# Patient Record
Sex: MALE | Race: WHITE | NOT HISPANIC OR LATINO | Employment: FULL TIME
[De-identification: names, ages, dates, MRNs, and addresses within clinical notes are randomized per-mention and may not be internally consistent; named-entity substitution may affect disease eponyms.]

---

## 2017-07-21 ENCOUNTER — RECORDS - HEALTHEAST (OUTPATIENT)
Dept: ADMINISTRATIVE | Facility: OTHER | Age: 37
End: 2017-07-21

## 2017-11-15 ENCOUNTER — OFFICE VISIT - HEALTHEAST (OUTPATIENT)
Dept: FAMILY MEDICINE | Facility: CLINIC | Age: 37
End: 2017-11-15

## 2017-11-15 DIAGNOSIS — M77.01 BILATERAL MEDIAL EPICONDYLITIS OF ELBOW JOINT: ICD-10-CM

## 2017-11-15 DIAGNOSIS — M77.02 BILATERAL MEDIAL EPICONDYLITIS OF ELBOW JOINT: ICD-10-CM

## 2017-11-15 DIAGNOSIS — Z00.00 ROUTINE PHYSICAL EXAMINATION: ICD-10-CM

## 2017-11-15 LAB
CHOLEST SERPL-MCNC: 194 MG/DL
FASTING STATUS PATIENT QL REPORTED: YES
HDLC SERPL-MCNC: 76 MG/DL
LDLC SERPL CALC-MCNC: 107 MG/DL
TRIGL SERPL-MCNC: 56 MG/DL

## 2017-11-15 ASSESSMENT — MIFFLIN-ST. JEOR: SCORE: 2104.22

## 2018-08-28 ENCOUNTER — COMMUNICATION - HEALTHEAST (OUTPATIENT)
Dept: SCHEDULING | Facility: CLINIC | Age: 38
End: 2018-08-28

## 2018-08-29 ENCOUNTER — OFFICE VISIT - HEALTHEAST (OUTPATIENT)
Dept: FAMILY MEDICINE | Facility: CLINIC | Age: 38
End: 2018-08-29

## 2018-08-29 DIAGNOSIS — K40.90 LEFT INGUINAL HERNIA: ICD-10-CM

## 2018-08-29 RX ORDER — IBUPROFEN 600 MG/1
600 TABLET, FILM COATED ORAL
Status: SHIPPED | COMMUNITY
Start: 2015-01-17

## 2018-08-29 RX ORDER — CETIRIZINE HYDROCHLORIDE, PSEUDOEPHEDRINE HYDROCHLORIDE 5; 120 MG/1; MG/1
1 TABLET, FILM COATED, EXTENDED RELEASE ORAL 2 TIMES DAILY
Status: SHIPPED | COMMUNITY
Start: 2018-08-29

## 2018-08-31 ENCOUNTER — OFFICE VISIT - HEALTHEAST (OUTPATIENT)
Dept: SURGERY | Facility: CLINIC | Age: 38
End: 2018-08-31

## 2018-08-31 DIAGNOSIS — K40.90 LEFT INGUINAL HERNIA: ICD-10-CM

## 2018-08-31 ASSESSMENT — MIFFLIN-ST. JEOR: SCORE: 2093.33

## 2018-09-18 ENCOUNTER — COMMUNICATION - HEALTHEAST (OUTPATIENT)
Dept: FAMILY MEDICINE | Facility: CLINIC | Age: 38
End: 2018-09-18

## 2018-09-19 ENCOUNTER — ANESTHESIA - HEALTHEAST (OUTPATIENT)
Dept: SURGERY | Facility: AMBULATORY SURGERY CENTER | Age: 38
End: 2018-09-19

## 2018-09-20 ENCOUNTER — OFFICE VISIT - HEALTHEAST (OUTPATIENT)
Dept: FAMILY MEDICINE | Facility: CLINIC | Age: 38
End: 2018-09-20

## 2018-09-20 DIAGNOSIS — Z01.818 PRE-OP EXAM: ICD-10-CM

## 2018-09-20 DIAGNOSIS — K40.90 HERNIA, INGUINAL: ICD-10-CM

## 2018-09-21 ENCOUNTER — SURGERY - HEALTHEAST (OUTPATIENT)
Dept: SURGERY | Facility: AMBULATORY SURGERY CENTER | Age: 38
End: 2018-09-21

## 2018-09-21 ASSESSMENT — MIFFLIN-ST. JEOR: SCORE: 2097.87

## 2018-10-08 ENCOUNTER — OFFICE VISIT - HEALTHEAST (OUTPATIENT)
Dept: SURGERY | Facility: CLINIC | Age: 38
End: 2018-10-08

## 2018-10-08 DIAGNOSIS — K40.91 RECURRENT LEFT INGUINAL HERNIA: ICD-10-CM

## 2018-10-08 ASSESSMENT — MIFFLIN-ST. JEOR: SCORE: 2097.87

## 2018-11-19 ENCOUNTER — RECORDS - HEALTHEAST (OUTPATIENT)
Dept: ADMINISTRATIVE | Facility: OTHER | Age: 38
End: 2018-11-19

## 2018-11-21 ENCOUNTER — RECORDS - HEALTHEAST (OUTPATIENT)
Dept: ADMINISTRATIVE | Facility: OTHER | Age: 38
End: 2018-11-21

## 2018-11-29 ENCOUNTER — OFFICE VISIT - HEALTHEAST (OUTPATIENT)
Dept: FAMILY MEDICINE | Facility: CLINIC | Age: 38
End: 2018-11-29

## 2018-11-29 DIAGNOSIS — M71.161 SEPTIC PREPATELLAR BURSITIS OF RIGHT KNEE: ICD-10-CM

## 2018-12-03 ENCOUNTER — COMMUNICATION - HEALTHEAST (OUTPATIENT)
Dept: FAMILY MEDICINE | Facility: CLINIC | Age: 38
End: 2018-12-03

## 2018-12-10 ENCOUNTER — OFFICE VISIT - HEALTHEAST (OUTPATIENT)
Dept: FAMILY MEDICINE | Facility: CLINIC | Age: 38
End: 2018-12-10

## 2018-12-10 DIAGNOSIS — M71.161 SEPTIC PREPATELLAR BURSITIS OF RIGHT KNEE: ICD-10-CM

## 2019-01-31 ENCOUNTER — COMMUNICATION - HEALTHEAST (OUTPATIENT)
Dept: ALLERGY | Facility: CLINIC | Age: 39
End: 2019-01-31

## 2020-10-09 ENCOUNTER — OFFICE VISIT - HEALTHEAST (OUTPATIENT)
Dept: FAMILY MEDICINE | Facility: CLINIC | Age: 40
End: 2020-10-09

## 2020-10-09 DIAGNOSIS — S61.012A LACERATION OF LEFT THUMB WITHOUT FOREIGN BODY WITHOUT DAMAGE TO NAIL, INITIAL ENCOUNTER: ICD-10-CM

## 2021-05-26 VITALS
OXYGEN SATURATION: 97 % | DIASTOLIC BLOOD PRESSURE: 79 MMHG | RESPIRATION RATE: 16 BRPM | HEART RATE: 62 BPM | SYSTOLIC BLOOD PRESSURE: 144 MMHG

## 2021-05-31 VITALS — HEIGHT: 77 IN | WEIGHT: 237.4 LBS | BODY MASS INDEX: 28.03 KG/M2

## 2021-06-01 VITALS — BODY MASS INDEX: 27.88 KG/M2 | WEIGHT: 235.13 LBS

## 2021-06-01 VITALS — WEIGHT: 235 LBS | HEIGHT: 77 IN | BODY MASS INDEX: 27.75 KG/M2

## 2021-06-02 VITALS — WEIGHT: 236 LBS | HEIGHT: 77 IN | BODY MASS INDEX: 27.87 KG/M2

## 2021-06-02 VITALS — WEIGHT: 243.1 LBS | BODY MASS INDEX: 28.83 KG/M2

## 2021-06-02 VITALS — WEIGHT: 236.4 LBS | BODY MASS INDEX: 28.03 KG/M2

## 2021-06-02 VITALS — BODY MASS INDEX: 27.87 KG/M2 | WEIGHT: 236 LBS | HEIGHT: 77 IN

## 2021-06-02 VITALS — BODY MASS INDEX: 28.46 KG/M2 | WEIGHT: 240 LBS

## 2021-06-12 NOTE — PROGRESS NOTES
Impression:  Skin avulsion left thumb    Plan:  The base of the wound was packed with Surgicel resulting in good control of the bleeding.  Dressings were applied.  The patient's tetanus status was updated with a Tdap booster.  Keep the dressings on for 48 hours and then gently remove them and keep the wound clean and dry, return if redness swelling drainage or other problems      Chief Complaint:  Chief Complaint   Patient presents with     Laceration     LT thumb about an hour ago-- cut chunk of skin off          HPI:   Suhas Barbosa is a 40 y.o. male who presents to this clinic for the evaluation of left thumb injury.  The patient was slicing some vegetables when he accidentally cut off a small bit of skin on the tip of his left thumb.  He continued bleeding and he could not control the bleeding with pressure.  No numbness or weakness.  No other injuries.  Last tetanus was greater than 10 years ago      PMH:   Past Medical History:   Diagnosis Date     Hiatal hernia      Septic prepatellar bursitis of right knee 11/21/2018     Sore Throat     Created by Conversion      Past Surgical History:   Procedure Laterality Date     NASAL FRACTURE SURGERY       LA LAP,INGUINAL HERNIA REPR,INITIAL Left 9/21/2018    Procedure: REPAIR, HERNIA, INGUINAL, LAPAROSCOPIC;  Surgeon: Venkatesh Fernandez MD;  Location: [facility];  Service: General         ROS:  All other systems negative    Meds:    Current Outpatient Medications:      cetirizine-pseudoephedrine (ZYRTEC-D) 5-120 mg per tablet, Take 1 tablet by mouth 2 (two) times a day., Disp: , Rfl:      ibuprofen (ADVIL,MOTRIN) 600 MG tablet, Take 600 mg by mouth., Disp: , Rfl:      multivitamin Liqd solution, Take 5 mL by mouth daily., Disp: , Rfl:         Social:  Social History     Socioeconomic History     Marital status:      Spouse name: Not on file     Number of children: Not on file     Years of education: Not on file     Highest education level: Not on file    Occupational History     Not on file   Social Needs     Financial resource strain: Not on file     Food insecurity     Worry: Not on file     Inability: Not on file     Transportation needs     Medical: Not on file     Non-medical: Not on file   Tobacco Use     Smoking status: Former Smoker     Smokeless tobacco: Never Used   Substance and Sexual Activity     Alcohol use: No     Drug use: No     Sexual activity: Yes     Partners: Female     Comment: 4 children    Lifestyle     Physical activity     Days per week: Not on file     Minutes per session: Not on file     Stress: Not on file   Relationships     Social connections     Talks on phone: Not on file     Gets together: Not on file     Attends Anabaptism service: Not on file     Active member of club or organization: Not on file     Attends meetings of clubs or organizations: Not on file     Relationship status: Not on file     Intimate partner violence     Fear of current or ex partner: Not on file     Emotionally abused: Not on file     Physically abused: Not on file     Forced sexual activity: Not on file   Other Topics Concern     Not on file   Social History Narrative     Not on file         Physical Exam:  Vitals:    10/09/20 1905   BP: 144/79   Pulse: 62   Resp: 16   SpO2: 97%      Left thumb shows an area of skin avulsion off the tip of the left thumb about 7 x 6 mm in diameter.  There is venous oozing from the base of the wound.  No foreign body seen.  There is normal sensation on the tip of the thumb.  Capillary refill is 1 second.  There is normal range of motion throughout all joints in the left thumb.  No other injury    Results:    No results found for this or any previous visit (from the past 24 hour(s)).    No results found.      Ash Slagado MD

## 2021-06-14 NOTE — PROGRESS NOTES
Assessment/Plan:        1. Routine physical examination  - Basic Metabolic Panel  - Lipid Profile    2. Bilateral medial epicondylitis of elbow joint  Exam findings and the pathophysiology revealed the patient  Discuss treatment options to include the use of analgesics, cortisone injection of the joints and her physical therapy.  At this point patient opted for monitoring and using analgesics and will follow up as needed      ______________________________________________________________________     Suhas Barbosa is a 37 y.o. male coming in today for a routine physical.      Other concerns to address today:   Having pain to both elbows with wax and waning pain is starting back in March 2017   He describes that the pain is been a kind of sore located in the insides of the elbows and he started noticing back when he was started lifting weights.  The setting of some days are worse than the others and worse with lifting or holding his child.  NSAIDs and other analgesics does not seem to be doing much.       PMHX: healthy     Past Surgical History:   Procedure Laterality Date     NO PAST SURGERIES          Family History   Problem Relation Age of Onset     Heart disease Mother      Diabetes Father         Social History     Social History     Marital status:      Spouse name: N/A     Number of children: N/A     Years of education: N/A     Social History Main Topics     Smoking status: Former Smoker     Smokeless tobacco: Never Used     Alcohol use No     Drug use: No     Sexual activity: Yes     Partners: Female      Comment: 4 children      Other Topics Concern     None     Social History Narrative     None        No current outpatient prescriptions on file.       Immunization History   Administered Date(s) Administered     Hep A, historic 07/10/2008, 11/06/2013     Influenza,seasonal quad, PF 11/06/2013     Tdap 07/10/2008      ______________________________________________________________________     Review  "of Systems -   General : negative  Ophthalmic : negative  ENT : negative  Respiratory : no cough, shortness of breath, or wheezing  Cardiovascular : no chest pain or dyspnea on exertion  Gastrointestinal : no abdominal pain, change in bowel habits, or black or bloody stools  Genito-Urinary : no dysuria, trouble voiding, or hematuria  Musculoskeletal : See HPI   Neurological : negative  Dermatological : negative    Allergy and Immunology : negative  Hematological and Lymphatic : negative  Endocrine : negative      ______________________________________________________________________     Exam:    Wt Readings from Last 3 Encounters:   11/15/17 (!) 237 lb 6.4 oz (107.7 kg)     BP Readings from Last 3 Encounters:   11/15/17 118/82      /82 (Patient Site: Right Arm, Patient Position: Sitting, Cuff Size: Adult Regular)  Pulse (!) 56  Temp 97.7  F (36.5  C) (Oral)   Ht 6' 5\" (1.956 m)  Wt (!) 237 lb 6.4 oz (107.7 kg)  SpO2 97%  BMI 28.15 kg/m2        General appearance - alert, well appearing, and in no distress  Mental status - alert, oriented to person, place, and time  Eyes - pupils equal and reactive, extraocular eye movements intact  Ears - bilateral TM's and external ear canals normal  Nose - normal and patent, no erythema, discharge or polyps  Mouth - mucous membranes moist, pharynx normal without lesions  Neck - supple, no significant adenopathy, thyroid exam: thyroid is normal in size without nodules or tenderness  Lymphatics - no palpable lymphadenopathy  Chest - clear to auscultation, no wheezes, rales or rhonchi, symmetric air entry  Heart - normal rate, regular rhythm, normal S1, S2, no murmurs, rubs, clicks or gallops  Abdomen - soft, nontender, nondistended, no masses or organomegaly   Male - no penile lesions or discharge, no testicular masses or tenderness, no hernias  Back exam - full range of motion, no tenderness, palpable spasm or pain on motion  Neurological - alert, oriented, normal " speech, no focal findings or movement / tremor disorder noted  Musculoskeletal -elbow joint exam: Normal to inspection no swelling palpable tenderness to the medial epicondyles bilaterally, no other joint tenderness, deformity or swelling  Extremities - peripheral pulses normal, no pedal edema, no clubbing or cyanosis  Skin - normal coloration and turgor, no rashes, no suspicious skin lesions noted

## 2021-06-16 PROBLEM — M70.40 PREPATELLAR BURSITIS: Status: ACTIVE | Noted: 2018-11-19

## 2021-06-16 PROBLEM — M71.161 SEPTIC PREPATELLAR BURSITIS OF RIGHT KNEE: Status: ACTIVE | Noted: 2018-11-21

## 2021-06-16 PROBLEM — K40.91 RECURRENT LEFT INGUINAL HERNIA: Status: ACTIVE | Noted: 2018-09-05

## 2021-06-18 NOTE — PATIENT INSTRUCTIONS - HE
Patient Instructions by Ash Salgado MD at 10/9/2020  6:50 PM     Author: Ash Salgado MD Service: -- Author Type: Physician    Filed: 10/9/2020  7:31 PM Encounter Date: 10/9/2020 Status: Signed    : Ash Salgado MD (Physician)         Patient Education     Skin Tear (Skin Avulsion)  A skin avulsion is a tearing of the top layer of skin. This commonly happens after a fall or other injury. It also tends to be more common in older people, or those taking blood thinners or steroids for long periods of time.  Home care  These guidelines will help you care for your wound at home:    Keep the wound clean and dry for the first 24 to 48 hours, or as your healthcare provider advises.    If there is a dressing or bandage, change it when it gets wet or dirty. Otherwise, leave it on for the first 24 hours, then change it once a day or as often as the doctor says.    If stitches or staples were used, check the wound every day.    After taking off the dressing, wash the area gently with soap and water. Clean as close to the stitches as you can. Avoid washing or rubbing the stitches directly.    After 3 days you can keep the bandages off the wound, unless told otherwise, or there is continued drainage.  Allow the wound to be open to the air.    Keep a thin layer of antibiotic ointment on the cut. This will keep the wound clean, make it easier to remove the stitches, and reduce scarring.    If your wound is oozing, you can put a nonstick dressing over it. Then, reapply the bandage or dressing as you were told.    You can shower as usual after the first 24 hours, but don't soak the area in water (no baths or swimming) until the stitches or staples are taken out.    If surgical tape was used, keep the area clean and dry. If it becomes wet, blot it dry with a clean towel.    If skin glue was used, don't put any creams, lotions, or antibiotic ointments on it. These can dissolve the glue. Usually the glue will  flake off in about 5 to 10 days by itself. Try to resist picking it off before that so the wound doesn't open up. When it gets wet, pat it dry.  Here is some information about medicine:    You may use over-the-counter medicine such as acetaminophen or ibuprofen to control pain, unless another pain medicine was given. If you have chronic liver or kidney disease or ever had a stomach ulcer or gastrointestinal bleeding, talk with your doctor before using these medicines.    If you were given antibiotics, take them until they are all used up. It is important to finish the antibiotics even if the wound looks better. This will ensure that the infection has cleared.  Follow-up care  Follow up with your healthcare provider, or as advised.    Watch for any signs of infection, such as increasing redness, swelling, or pus coming out. If this happens, don't wait for your scheduled visit. Instead, see a doctor sooner.    Stitches or staples are usually taken out within 5 to 14 days. This varies depending on what part of your body they are on, and the type of wound. The doctor will tell you how long stitches should be left in.     If surgical tape was used, it is usually left on for 7 to 10 days. You can remove surgical tape after that unless you were told otherwise. If you try to remove it, and it is too hard, soaking can help. Surgical tape strips will eventually fall off on their own. If the edges of the cut pull apart, stop removing the tape or strips and follow up with your doctor    As mentioned above, skin glue will flake off by itself in 5 to 10 days, so you don't need to pull it off.  If any X-rays were done, you will be notified of any changes that may affect your care.  When to seek medical advice  Call your healthcare provider right away if any of these occur:    Increasing pain in the wound    Redness, swelling, or pus coming from the wound    Fever of 100.4 F (38 C) or higher, or as directed by your healthcare  provider    Sutures or staples come apart or fall out before your next appointment and the wound edges look as if they will re-open    Surgical tape closures fall off before 7 days, and the wound edges look as if they will re-open    Bleeding not controlled by direct pressure  Date Last Reviewed: 9/1/2016 2000-2017 [employer]. [address] [place] [address], [place] [address]. All rights reserved. This information is not intended as a substitute for professional medical care. Always follow your healthcare professional's instructions.

## 2021-06-20 NOTE — ANESTHESIA CARE TRANSFER NOTE
Last vitals:   Vitals:    09/21/18 1055   BP: 116/59   Pulse: 84   Resp: 16   Temp: 36.4  C (97.5  F)   SpO2: 100%     Pt brought to PACU on 10L facemask. Monitors applied. VSS upon arrival.    Patient's level of consciousness is drowsy  Spontaneous respirations: yes  Maintains airway independently: yes  Dentition unchanged: yes  Oropharynx: oropharynx clear of all foreign objects    QCDR Measures:  ASA# 20 - Surgical Safety Checklist: WHO surgical safety checklist completed prior to induction  PQRS# 430 - Adult PONV Prevention: 4558F - Pt received => 2 anti-emetic agents (different classes) preop & intraop  ASA# 8 - Peds PONV Prevention: NA - Not pediatric patient, not GA or 2 or more risk factors NOT present  PQRS# 424 - [place]-op Temp Management: 4559F - At least one body temp DOCUMENTED => 35.5C or 95.9F within required timeframe  PQRS# 426 - PACU Transfer Protocol: - Transfer of care checklist used  ASA# 14 - Acute Post-op Pain: ASA14B - Patient did NOT experience pain >= 7 out of 10

## 2021-06-20 NOTE — PROGRESS NOTES
"HPI: Pt is here for follow up of a laparoscopic left inguinal hernia repair.  He is doing well. His appetite is good, and bowel function regular.  No fevers or chills. Ambulating without problems.       /74 (Patient Site: Right Arm, Patient Position: Sitting, Cuff Size: Adult Regular)  Pulse 64  Ht 6' 5\" (1.956 m)  Wt (!) 236 lb (107 kg)  SpO2 99%  BMI 27.99 kg/m2      EXAM: This is a  38 y.o. MAN in no distress  GENERAL: Appears well  CHEST clear  CVS S1S2 NSR  ABDOMEN: Soft, non-tender. Incisions look good, repair intact  EXT: warm, moves without difficulty      Assessment/Plan:   S/P laparoscopic left inguinal hernia rpair  Doing well  Return to activities as tolerates  Return prn.      Venkatesh Fernandez MD  [facility] Department of Surgery  "

## 2021-06-20 NOTE — PROGRESS NOTES
Chief Complaint   Patient presents with     Groin Pain     left sided groin pain for over 1 month         HPI    Patient is here for a few days of mild to moderate constant left inguinal hernia pain worsened with pressure to the area. He has had this for a moth now and the pain just got worse the last few days. No nausea, vomiting,abodminal pain. He goes to the gym 3 times a week. No urinary symptoms.    ROS: Pertinent ROS noted in HPI.     Allergies   Allergen Reactions     Penicillins Hives       Patient Active Problem List   Diagnosis     Sore Throat     Allergies       Family History   Problem Relation Age of Onset     Heart disease Mother      Diabetes Father        Social History     Social History     Marital status:      Spouse name: N/A     Number of children: N/A     Years of education: N/A     Occupational History     Not on file.     Social History Main Topics     Smoking status: Former Smoker     Smokeless tobacco: Never Used     Alcohol use No     Drug use: No     Sexual activity: Yes     Partners: Female      Comment: 4 children      Other Topics Concern     Not on file     Social History Narrative         Objective:    Vitals:    08/29/18 1129   BP: 118/80   Pulse: 64   Resp: 14   Temp: 98.1  F (36.7  C)   SpO2: 99%       Gen:NAD  Abd: normal bowel sounds, soft, no pain, no mass/HSM  : normal inspection of penis and scrotum. No pain to palpation of testicles, no testicular mass. There is a palpable left inguinal hernia that is reducible but with pain.     Assessment:    Left inguinal hernia    Plan:    Consulted Dr. Yu from Surgery who recommended patient to be seen this week. Patient was given phone number to call for appt this week (see instructions).

## 2021-06-20 NOTE — ANESTHESIA PREPROCEDURE EVALUATION
Anesthesia Evaluation      Patient summary reviewed   No history of anesthetic complications     Airway   Mallampati: I  Neck ROM: full   Pulmonary     breath sounds clear to auscultation  (+) a smoker (former)  (-) COPD, asthma, sleep apnea, rhonchi, wheezes, rales, stridor                         Cardiovascular   Exercise tolerance: > or = 4 METS  (-) hypertension, past MI, murmur  Rhythm: regular  Rate: normal,    no murmur      Neuro/Psych    (-) no seizures, no CVA    Endo/Other    (-) no diabetes, no obesity     GI/Hepatic/Renal    (+) hiatal hernia,             Dental - normal exam     Comment: No loose, chipped, partial, removable or dentures.                         Anesthesia Plan  Planned anesthetic: general endotracheal    ASA 1   Induction: intravenous   Anesthetic plan and risks discussed with: patient    Post-op plan: routine recovery

## 2021-06-20 NOTE — ANESTHESIA POSTPROCEDURE EVALUATION
Patient: Suhas Barbosa  REPAIR, HERNIA, INGUINAL, LAPAROSCOPIC  Anesthesia type: general    Patient location: PACU  Last vitals:   Vitals:    09/21/18 1210   BP: 116/58   Pulse: 64   Resp: 16   Temp:    SpO2: 100%     Post vital signs: stable  Level of consciousness: awake and responds to simple questions  Post-anesthesia pain: pain controlled  Post-anesthesia nausea and vomiting: no  Pulmonary: unassisted, return to baseline  Cardiovascular: stable and blood pressure at baseline  Hydration: adequate  Anesthetic events: no    QCDR Measures:  ASA# 11 - [place]-op Cardiac Arrest: ASA11B - Patient did NOT experience unanticipated cardiac arrest  ASA# 12 - [place]-op Mortality Rate: ASA12B - Patient did NOT die  ASA# 13 - PACU Re-Intubation Rate: ASA13B - Patient did NOT require a new airway mgmt  ASA# 10 - Composite Anes Safety: ASA10A - No serious adverse event    Additional Notes:

## 2021-06-20 NOTE — PROGRESS NOTES
Preoperative Exam    Scheduled Procedure: Hernia repair, laparoscopic   Surgery Date:  9/21/18  Surgery Location: [facility], fax [phone]    Surgeon:  Dr. Fernandez     Assessment/Plan:     1. Pre-op exam    2. Hernia, inguinal      Surgical Procedure Risk: Low (reported cardiac risk generally < 1%)  Have you had prior anesthesia?: Yes  Have you or any family members had a previous anesthesia reaction:  No  Do you or any family members have a history of a clotting or bleeding disorder?: No  Cardiac Risk Assessment: no increased risk for major cardiac complications    Patient approved for surgery with general or local anesthesia.        Functional Status: Independent  Patient plans to recover at home with family.     Subjective:      Suhas Barbosa is a 38 y.o. male who presents for a preoperative consultation.  He has a left inguinal hernia, with having  pain and bulging in this area. He has noted this for the past few month(s), and have progressed and increased over this time      All other systems reviewed and are negative, other than those listed in the HPI.    Pertinent History  Do you have difficulty breathing or chest pain after walking up a flight of stairs: No  History of obstructive sleep apnea: No  Steroid use in the last 6 months: No  Frequent Aspirin/NSAID use: No  Prior Blood Transfusion: No  Prior Blood Transfusion Reaction: No  If for some reason prior to, during or after the procedure, if it is medically indicated, would you be willing to have a blood transfusion?:  There is no transfusion refusal.    Current Outpatient Prescriptions   Medication Sig Dispense Refill     cetirizine-pseudoephedrine (ZYRTEC-D) 5-120 mg per tablet Take 1 tablet by mouth 2 (two) times a day.       multivitamin Liqd solution Take 5 mL by mouth daily.       ibuprofen (ADVIL,MOTRIN) 600 MG tablet Take 600 mg by mouth.       naproxen sodium/pseudoephedrin (ALEVE COLD AND SINUS ORAL) Take by mouth.        No current facility-administered medications for this visit.         Allergies   Allergen Reactions     Penicillins Hives       Patient Active Problem List   Diagnosis     Allergies     Recurrent left inguinal hernia       Past Medical History:   Diagnosis Date     Hiatal hernia      Sore Throat     Created by Conversion        Past Surgical History:   Procedure Laterality Date     NASAL FRACTURE SURGERY         Social History     Social History     Marital status:      Spouse name: N/A     Number of children: N/A     Years of education: N/A     Occupational History     Not on file.     Social History Main Topics     Smoking status: Former Smoker     Smokeless tobacco: Never Used     Alcohol use No     Drug use: No     Sexual activity: Yes     Partners: Female      Comment: 4 children      Other Topics Concern     Not on file     Social History Narrative             Objective:     Vitals:    09/20/18 1335   BP: 112/72   Pulse: 74   Temp: 98.1  F (36.7  C)   TempSrc: Oral   SpO2: 98%   Weight: (!) 236 lb 6.4 oz (107.2 kg)         Physical Exam:  General Appearance:    Alert, cooperative, no distress, appears stated age   Head:    Normocephalic, without obvious abnormality, atraumatic   Eyes:    PERRL, conjunctiva/corneas clear, EOM's intact, fundi     benign, both eyes        Throat:   Lips, mucosa, and tongue normal;  gums normal   Neck:   Supple, symmetrical, trachea midline, no adenopathy;        thyroid:  No enlargement/tenderness/nodules; no carotid    bruit or JVD   Back:     Symmetric, no curvature, ROM normal, no CVA tenderness   Lungs:     Clear to auscultation bilaterally, respirations unlabored   Chest wall:    No tenderness or deformity   Heart:    Regular rate and rhythm, S1 and S2 normal, no murmur, rub   or gallop   Abdomen:     Soft, non-tender, bowel sounds active all four quadrants,     no masses, no organomegaly   Genitalia:    no penile lesions or discharge, no testicular masses or  tenderness, +left inguinal hernia   Extremities:   Extremities normal, atraumatic, no cyanosis or edema   Pulses:   2+ and symmetric all extremities   Skin:   Skin color, texture, turgor normal, no rashes or lesions   Lymph nodes:   Cervical, supraclavicular, and axillary nodes normal   Neurologic:   CNII-XII intact. Normal strength, sensation and reflexes       throughout         There are no Patient Instructions on file for this visit.        Labs:  No labs were ordered during this visit    Immunization History   Administered Date(s) Administered     Hep A, historic 07/10/2008, 11/06/2013     Influenza,seasonal quad, PF 11/06/2013     Tdap 07/10/2008           Electronically signed by Douglas Lowery MD 09/20/18 1:30 PM

## 2021-06-20 NOTE — PROGRESS NOTES
"HPI:  This is a 38 y.o. male here today with concerns of pain and bulging in his left groin area. He has noted this for the past few month(s). The symptoms have progressed and increased over this time. He comes in for evaluation secondary to the hernia causing enough issues to bother them with daily activities or chores.    Allergies:Penicillins    Past Medical History:   Diagnosis Date     Sore Throat     Created by Conversion        Past Surgical History:   Procedure Laterality Date     NASAL FRACTURE SURGERY         CURRENT MEDS:      Family History   Problem Relation Age of Onset     Heart disease Mother      Diabetes Father      No Medical Problems Sister      No Medical Problems Brother         reports that he has quit smoking. He has never used smokeless tobacco. He reports that he does not drink alcohol or use illicit drugs.    Review of Systems - Negative except left groin bulge and pain, Otherwise twelve system of review is negative.      Vitals:    08/31/18 1354   BP: 124/73   Patient Site: Right Arm   Patient Position: Sitting   Cuff Size: Adult Regular   Pulse: 65   SpO2: 98%   Weight: (!) 235 lb (106.6 kg)   Height: 6' 5\" (1.956 m)       Body mass index is 27.87 kg/(m^2).    EXAM:  General: NAD   HEENT: normocephalic, PERRLA and EOMS intact  Mounth: Mucus membranes moist  Neck: Supple  Chest: Clear to auscultation bilaterally  CV: RRR  ABD: Soft nontender and nondistended, left inguinal hernia, reducible  EXT: Warm, pulses intact,   Neuro: Alert and oriented x3  Back: no CVA tenderness      Assessment/Plan: Pt with a left inguinal hernia. I discussed this at length with He.  I went over conservative management as well as surgical treatment of this.. I would plan on doing this via anlaparoscopic  approach with possible use of mesh. I went over the small risks of surgery including but not limited to bleeding and infection, anesthesia, recurrence rates and nerve injury. I discussed the expected recovery " time as well. Will get this scheduled. He will contact us to have this scheduled.      Venkatesh Fernandez MD  [facility] Department of Surgery

## 2021-06-22 NOTE — PROGRESS NOTES
Assessment/Plan:          1. Septic prepatellar bursitis of right knee     Continue current management with the antibiotics.   Expect complete recovery by the time done with the antibiotics.   May call for additional extended refill.     Refill on Clindamycin given today.                Subjective:    Patient ID:   Suhas Barbosa is a 38 y.o. male comes in follow up to this right knee infection.   He was hospitalized on 11/19/2018 for septic prepatellar bursitis of the right knee which grew out staph aureus;  improved on IV Ancef and clindamycin and discharged on oral clindamycin and Keflex.     Sx are overall improving well, however, swelling is yet persisting.        Review of Systems  General : negative  Musculoskeletal : see HPI   Dermatological : see HPI   Allergy: reviewed      The following patient's history were reviewed and updated as appropriate:   He  has a past medical history of Hiatal hernia, Septic prepatellar bursitis of right knee (11/21/2018), and Sore Throat..      Outpatient Encounter Medications as of 11/29/2018   Medication Sig Dispense Refill     cephalexin (KEFLEX) 500 MG capsule TAKE 2 CAPSULES BY MOUTH 4 TIMES DAILY FOR 9 DAYS  0     cetirizine-pseudoephedrine (ZYRTEC-D) 5-120 mg per tablet Take 1 tablet by mouth 2 (two) times a day.       clindamycin (CLEOCIN) 150 MG capsule        ibuprofen (ADVIL,MOTRIN) 600 MG tablet Take 600 mg by mouth.       multivitamin Liqd solution Take 5 mL by mouth daily.       No facility-administered encounter medications on file as of 11/29/2018.          Objective:   /76 (Patient Site: Right Arm, Patient Position: Lying, Cuff Size: Adult Regular)   Wt (!) 240 lb (108.9 kg)   BMI 28.46 kg/m        Physical Exam  General: NAD, well hydrated   Musc. Exam: right knee: + swelling, mildly warm and erythematous in comparison to the left knee.

## 2021-06-22 NOTE — PROGRESS NOTES
Assessment/Plan:        1. Septic prepatellar bursitis of right knee  Improving on antibiotics which he has few more doses left to finish.   Exam findings were discussed and assurance given.     Plan:   To complete the full course of antibiotics provided.   follow up with any residual symptoms.                Subjective:    Patient ID:   Suhas Barbosa is a 38 y.o. male comes in follow up of prepatellar septic bursitis, managed on antibiotics. He has taken two rounds of antibiotics so far and on the last leg of finishing them, with gradual but steady improvement.  He continues to note the knee swelling ( which has regressed significantly from when it first started ) with no residual pain, except for when he is kneeling.   He has no other concerns.        Review of Systems  General : negative  Respiratory : no cough, shortness of breath, or wheezing  Cardiovascular : no chest pain or dyspnea on exertion  Gastrointestinal : no abdominal pain, change in bowel habits, or black or bloody stools  Genito-Urinary :  no dysuria, trouble voiding, or hematuria  Musculoskeletal : See HPI   Dermatological : negative    Allergy: reviewed      The following patient's history were reviewed and updated as appropriate:   He  has a past medical history of Hiatal hernia, Septic prepatellar bursitis of right knee (2018), and Sore Throat..      Outpatient Encounter Medications as of 12/10/2018   Medication Sig Dispense Refill     cetirizine-pseudoephedrine (ZYRTEC-D) 5-120 mg per tablet Take 1 tablet by mouth 2 (two) times a day.       ibuprofen (ADVIL,MOTRIN) 600 MG tablet Take 600 mg by mouth.       multivitamin Liqd solution Take 5 mL by mouth daily.       [] cephalexin (KEFLEX) 500 MG capsule Take 1 capsule (500 mg total) by mouth 4 (four) times a day for 7 days. 28 capsule 0     [] clindamycin (CLEOCIN) 150 MG capsule Take 3 capsules (450 mg total) by mouth 4 (four) times a day for 7 days. 84 capsule 0     No  facility-administered encounter medications on file as of 12/10/2018.          Objective:   /64 (Patient Site: Right Arm, Patient Position: Sitting, Cuff Size: Adult Large)   Pulse 60   Temp 98.1  F (36.7  C) (Oral)   Wt (!) 243 lb 1.6 oz (110.3 kg)   SpO2 98%   BMI 28.83 kg/m        Physical Exam  General: NAD, well hydrated.   Right knee: +1 swelling, no warmth or erythema. Non tender to palpation

## 2021-06-26 ENCOUNTER — HEALTH MAINTENANCE LETTER (OUTPATIENT)
Age: 41
End: 2021-06-26

## 2021-10-16 ENCOUNTER — HEALTH MAINTENANCE LETTER (OUTPATIENT)
Age: 41
End: 2021-10-16

## 2022-04-28 ENCOUNTER — OFFICE VISIT (OUTPATIENT)
Dept: FAMILY MEDICINE | Facility: CLINIC | Age: 42
End: 2022-04-28
Payer: COMMERCIAL

## 2022-04-28 VITALS
BODY MASS INDEX: 28.13 KG/M2 | DIASTOLIC BLOOD PRESSURE: 82 MMHG | OXYGEN SATURATION: 98 % | WEIGHT: 238.2 LBS | HEIGHT: 77 IN | SYSTOLIC BLOOD PRESSURE: 121 MMHG | HEART RATE: 58 BPM

## 2022-04-28 DIAGNOSIS — Z13.220 SCREENING FOR LIPID DISORDERS: ICD-10-CM

## 2022-04-28 DIAGNOSIS — Z11.59 NEED FOR HEPATITIS C SCREENING TEST: ICD-10-CM

## 2022-04-28 DIAGNOSIS — Z11.4 SCREENING FOR HIV (HUMAN IMMUNODEFICIENCY VIRUS): ICD-10-CM

## 2022-04-28 DIAGNOSIS — Z00.00 ROUTINE GENERAL MEDICAL EXAMINATION AT A HEALTH CARE FACILITY: Primary | ICD-10-CM

## 2022-04-28 DIAGNOSIS — R22.41 LUMP OF THIGH, RIGHT: ICD-10-CM

## 2022-04-28 LAB
ALBUMIN SERPL-MCNC: 4.2 G/DL (ref 3.5–5)
ALP SERPL-CCNC: 44 U/L (ref 45–120)
ALT SERPL W P-5'-P-CCNC: 23 U/L (ref 0–45)
ANION GAP SERPL CALCULATED.3IONS-SCNC: 11 MMOL/L (ref 5–18)
AST SERPL W P-5'-P-CCNC: 25 U/L (ref 0–40)
BASOPHILS # BLD AUTO: 0 10E3/UL (ref 0–0.2)
BASOPHILS NFR BLD AUTO: 1 %
BILIRUB SERPL-MCNC: 0.7 MG/DL (ref 0–1)
BUN SERPL-MCNC: 13 MG/DL (ref 8–22)
CALCIUM SERPL-MCNC: 9.9 MG/DL (ref 8.5–10.5)
CHLORIDE BLD-SCNC: 101 MMOL/L (ref 98–107)
CHOLEST SERPL-MCNC: 243 MG/DL
CO2 SERPL-SCNC: 26 MMOL/L (ref 22–31)
CREAT SERPL-MCNC: 1 MG/DL (ref 0.7–1.3)
EOSINOPHIL # BLD AUTO: 0.2 10E3/UL (ref 0–0.7)
EOSINOPHIL NFR BLD AUTO: 4 %
ERYTHROCYTE [DISTWIDTH] IN BLOOD BY AUTOMATED COUNT: 12.2 % (ref 10–15)
FASTING STATUS PATIENT QL REPORTED: ABNORMAL
GFR SERPL CREATININE-BSD FRML MDRD: >90 ML/MIN/1.73M2
GLUCOSE BLD-MCNC: 86 MG/DL (ref 70–125)
HCT VFR BLD AUTO: 44.1 % (ref 40–53)
HDLC SERPL-MCNC: 85 MG/DL
HGB BLD-MCNC: 14.7 G/DL (ref 13.3–17.7)
HIV 1+2 AB+HIV1 P24 AG SERPL QL IA: NEGATIVE
IMM GRANULOCYTES # BLD: 0 10E3/UL
IMM GRANULOCYTES NFR BLD: 0 %
LDLC SERPL CALC-MCNC: 147 MG/DL
LYMPHOCYTES # BLD AUTO: 1.9 10E3/UL (ref 0.8–5.3)
LYMPHOCYTES NFR BLD AUTO: 34 %
MCH RBC QN AUTO: 29.6 PG (ref 26.5–33)
MCHC RBC AUTO-ENTMCNC: 33.3 G/DL (ref 31.5–36.5)
MCV RBC AUTO: 89 FL (ref 78–100)
MONOCYTES # BLD AUTO: 0.5 10E3/UL (ref 0–1.3)
MONOCYTES NFR BLD AUTO: 8 %
NEUTROPHILS # BLD AUTO: 2.9 10E3/UL (ref 1.6–8.3)
NEUTROPHILS NFR BLD AUTO: 52 %
PLATELET # BLD AUTO: 303 10E3/UL (ref 150–450)
POTASSIUM BLD-SCNC: 4.5 MMOL/L (ref 3.5–5)
PROT SERPL-MCNC: 7.3 G/DL (ref 6–8)
RBC # BLD AUTO: 4.96 10E6/UL (ref 4.4–5.9)
SODIUM SERPL-SCNC: 138 MMOL/L (ref 136–145)
TRIGL SERPL-MCNC: 57 MG/DL
WBC # BLD AUTO: 5.5 10E3/UL (ref 4–11)

## 2022-04-28 PROCEDURE — 99396 PREV VISIT EST AGE 40-64: CPT | Performed by: FAMILY MEDICINE

## 2022-04-28 PROCEDURE — 80053 COMPREHEN METABOLIC PANEL: CPT | Performed by: FAMILY MEDICINE

## 2022-04-28 PROCEDURE — 87389 HIV-1 AG W/HIV-1&-2 AB AG IA: CPT | Performed by: FAMILY MEDICINE

## 2022-04-28 PROCEDURE — 80061 LIPID PANEL: CPT | Performed by: FAMILY MEDICINE

## 2022-04-28 PROCEDURE — 36415 COLL VENOUS BLD VENIPUNCTURE: CPT | Performed by: FAMILY MEDICINE

## 2022-04-28 PROCEDURE — 99213 OFFICE O/P EST LOW 20 MIN: CPT | Mod: 25 | Performed by: FAMILY MEDICINE

## 2022-04-28 PROCEDURE — 86803 HEPATITIS C AB TEST: CPT | Performed by: FAMILY MEDICINE

## 2022-04-28 PROCEDURE — 85025 COMPLETE CBC W/AUTO DIFF WBC: CPT | Performed by: FAMILY MEDICINE

## 2022-04-28 NOTE — PROGRESS NOTES
"  ASSESSMENT/PLAN:       ICD-10-CM    1. Routine general medical examination at a health care facility  Z00.00 Comprehensive metabolic panel (BMP + Alb, Alk Phos, ALT, AST, Total. Bili, TP)     Lipid panel     CBC with platelets and differential     Comprehensive metabolic panel (BMP + Alb, Alk Phos, ALT, AST, Total. Bili, TP)     Lipid panel     CBC with platelets and differential   2. Screening for HIV (human immunodeficiency virus)  Z11.4 HIV Antigen Antibody Combo     HIV Antigen Antibody Combo   3. Need for hepatitis C screening test  Z11.59 Hepatitis C Screen Reflex to HCV RNA Quant and Genotype     Hepatitis C Screen Reflex to HCV RNA Quant and Genotype   4. Screening for lipid disorders  Z13.220    5. Lump of thigh, right  R22.41 US MSK Limited     Medical decision making: Patient is here for routine exam.  Health maintenance labs as above.  There is a soft tissue mass of the lateral thigh.  Will need further evaluation.  We will start with an ultrasound and if not clear, discussed with the patient that the radiologist might further suggest an MRI.  At this time patient remains asymptomatic from this.      COUNSELING:   Reviewed preventive health counseling, as reflected in patient instructions       Regular exercise       Healthy diet/nutrition       Consider Hep C screening for all patients one time for ages 18-79 years       HIV screeninx in teen years, 1x in adult years, and at intervals if high risk    Estimated body mass index is 28.83 kg/m  as calculated from the following:    Height as of 10/8/18: 1.956 m (6' 5\").    Weight as of 12/10/18: 110.3 kg (243 lb 1.6 oz).     Weight management plan: Discussed healthy diet and exercise guidelines    He reports that he has quit smoking. He has never used smokeless tobacco.      SUBJECTIVE:   CC: Suhas Barbosa is an 42 year old male who presents for preventative health visit.   Chief Complaint   Patient presents with     Physical     Pt is fasting " today, pt has a lump on upper right leg he wants looked at, X1 month, no discomfort.      Patient has been advised of split billing requirements and indicates understanding: Yes  Healthy Habits:     Getting at least 3 servings of Calcium per day:  Yes    Bi-annual eye exam:  NO    Dental care twice a year:  Yes    Sleep apnea or symptoms of sleep apnea:  None    Diet:  Breakfast skipped    Frequency of exercise:  4-5 days/week    Duration of exercise:  45-60 minutes    Taking medications regularly:  Yes    Barriers to taking medications:  None    Medication side effects:  Not applicable    PHQ-2 Total Score: 0    Additional concerns today:  No        Today's PHQ-2 Score:   PHQ-2 (  [employer]) 2022   Q1: Little interest or pleasure in doing things 0   Q2: Feeling down, depressed or hopeless 0   PHQ-2 Score 0   Q1: Little interest or pleasure in doing things Not at all   Q2: Feeling down, depressed or hopeless Not at all   PHQ-2 Score 0       Abuse: Current or Past(Physical, Sexual or Emotional)- No  Do you feel safe in your environment? Yes    Have you ever done Advance Care Planning? (For example, a Health Directive, POLST, or a discussion with a medical provider or your loved ones about your wishes): No, advance care planning information given to patient to review.  Patient plans to discuss their wishes with loved ones or provider.      Social History     Tobacco Use     Smoking status: Former Smoker     Packs/day: 0.50     Years: 5.00     Pack years: 2.50     Start date: 2001     Quit date: 2006     Years since quittin.3     Smokeless tobacco: Never Used   Substance Use Topics     Alcohol use: No     If you drink alcohol do you typically have >3 drinks per day or >7 drinks per week? No    Alcohol Use 2022   Prescreen: >3 drinks/day or >7 drinks/week? No   Prescreen: >3 drinks/day or >7 drinks/week? -   No flowsheet data found.      Reviewed orders with patient. Reviewed health maintenance  and updated orders accordingly - Yes  BP Readings from Last 3 Encounters:   22 121/82   10/09/20 (!) 144/79    Wt Readings from Last 3 Encounters:   22 108 kg (238 lb 3.2 oz)   12/10/18 110.3 kg (243 lb 1.6 oz)   18 108.9 kg (240 lb)                  Patient Active Problem List   Diagnosis     Allergies     Recurrent left inguinal hernia     Septic prepatellar bursitis of right knee     Prepatellar bursitis     Lump of thigh, right     Past Surgical History:   Procedure Laterality Date     NASAL FRACTURE SURGERY       [place] LAP,INGUINAL HERNIA REPR,INITIAL Left 2018    Procedure: REPAIR, HERNIA, INGUINAL, LAPAROSCOPIC;  Surgeon: Venkatesh Fernandez MD;  Location: [facility];  Service: General       Social History     Tobacco Use     Smoking status: Former Smoker     Packs/day: 0.50     Years: 5.00     Pack years: 2.50     Start date: 2001     Quit date: 2006     Years since quittin.3     Smokeless tobacco: Never Used   Substance Use Topics     Alcohol use: No     Family History   Problem Relation Age of Onset     Heart Disease Mother      Diabetes Father 66     No Known Problems Sister      No Known Problems Brother          Current Outpatient Medications   Medication Sig Dispense Refill     cetirizine-pseudoephedrine (ZYRTEC-D) 5-120 mg per tablet [CETIRIZINE-PSEUDOEPHEDRINE (ZYRTEC-D) 5-120 MG PER TABLET] Take 1 tablet by mouth 2 (two) times a day.       cholecalciferol (VITAMIN D3) 125 mcg (5000 units) capsule        ibuprofen (ADVIL,MOTRIN) 600 MG tablet [IBUPROFEN (ADVIL,MOTRIN) 600 MG TABLET] Take 600 mg by mouth.       Magnesium 400 MG CAPS        multivitamin Liqd solution [MULTIVITAMIN LIQD SOLUTION] Take 5 mL by mouth daily.         Reviewed and updated as needed this visit by clinical staff   Tobacco  Allergies  Meds   Med Hx   Fam Hx            Reviewed and updated as needed this visit by Provider   Tobacco     Med Hx   Fam Hx           Past Medical History:  "  Diagnosis Date     Acute pharyngitis     Created by Conversion      Hiatal hernia      Septic prepatellar bursitis of right knee 11/21/2018      Past Surgical History:   Procedure Laterality Date     NASAL FRACTURE SURGERY       [place] LAP,INGUINAL HERNIA REPR,INITIAL Left 9/21/2018    Procedure: REPAIR, HERNIA, INGUINAL, LAPAROSCOPIC;  Surgeon: Venkatesh Fernandez MD;  Location: [facility];  Service: General       Review of Systems  Constitutional, HEENT, cardiovascular, pulmonary, gi and gu systems are negative, except as otherwise noted.      OBJECTIVE:   /82 (BP Location: Left arm, Patient Position: Sitting, Cuff Size: Adult Large)   Pulse 58   Ht 1.956 m (6' 5\")   Wt 108 kg (238 lb 3.2 oz)   SpO2 98%   BMI 28.25 kg/m      Physical Exam  GENERAL: healthy, alert and no distress  EYES: Eyes grossly normal to inspection, PERRL and conjunctivae and sclerae normal  HENT: ear canals and TM's normal, nose and mouth without ulcers or lesions  NECK: no adenopathy, no asymmetry, masses, or scars and thyroid normal to palpation  RESP: lungs clear to auscultation - no rales, rhonchi or wheezes  CV: regular rate and rhythm, normal S1 S2, no S3 or S4, no murmur, click or rub, no peripheral edema and peripheral pulses strong  ABDOMEN: soft, nontender, no hepatosplenomegaly, no masses and bowel sounds normal  MS: Examination of the right thigh shows a large mass about 8 x 12 cm in the lateral area of the thigh.  This is soft and slightly fluctuant.  Margins are not totally delineated.  SKIN: no suspicious lesions or rashes  NEURO: Normal strength and tone, mentation intact and speech normal  PSYCH: mentation appears normal, affect normal/bright    Diagnostic Test Results:  Labs reviewed in Epic  Results for orders placed or performed in visit on 04/28/22 (from the past 24 hour(s))   CBC with platelets and differential    Narrative    The following orders were created for panel order CBC with platelets and " differential.  Procedure                               Abnormality         Status                     ---------                               -----------         ------                     CBC with platelets and d...[[id number]]                      Final result                 Please view results for these tests on the individual orders.   CBC with platelets and differential   Result Value Ref Range    WBC Count 5.5 4.0 - 11.0 10e3/uL    RBC Count 4.96 4.40 - 5.90 10e6/uL    Hemoglobin 14.7 13.3 - 17.7 g/dL    Hematocrit 44.1 40.0 - 53.0 %    MCV 89 78 - 100 fL    MCH 29.6 26.5 - 33.0 pg    MCHC 33.3 31.5 - 36.5 g/dL    RDW 12.2 10.0 - 15.0 %    Platelet Count 303 150 - 450 10e3/uL    % Neutrophils 52 %    % Lymphocytes 34 %    % Monocytes 8 %    % Eosinophils 4 %    % Basophils 1 %    % Immature Granulocytes 0 %    Absolute Neutrophils 2.9 1.6 - 8.3 10e3/uL    Absolute Lymphocytes 1.9 0.8 - 5.3 10e3/uL    Absolute Monocytes 0.5 0.0 - 1.3 10e3/uL    Absolute Eosinophils 0.2 0.0 - 0.7 10e3/uL    Absolute Basophils 0.0 0.0 - 0.2 10e3/uL    Absolute Immature Granulocytes 0.0 <=0.4 10e3/uL       Counseling Resources:  ATP IV Guidelines  Pooled Cohorts Equation Calculator  FRAX Risk Assessment  ICSI Preventive Guidelines  Dietary Guidelines for Americans, 2010  USDA's MyPlate  ASA Prophylaxis  Lung CA Screening    Jenifer Soliz MD  [facility]

## 2022-04-29 LAB — HCV AB SERPL QL IA: NONREACTIVE

## 2022-05-06 ENCOUNTER — ANCILLARY PROCEDURE (OUTPATIENT)
Dept: ULTRASOUND IMAGING | Facility: CLINIC | Age: 42
End: 2022-05-06
Attending: FAMILY MEDICINE
Payer: COMMERCIAL

## 2022-05-06 DIAGNOSIS — R22.41 LUMP OF THIGH, RIGHT: ICD-10-CM

## 2022-05-06 PROCEDURE — 76882 US LMTD JT/FCL EVL NVASC XTR: CPT | Mod: GC | Performed by: RADIOLOGY

## 2022-09-25 ENCOUNTER — HEALTH MAINTENANCE LETTER (OUTPATIENT)
Age: 42
End: 2022-09-25

## 2022-10-18 ENCOUNTER — TELEPHONE (OUTPATIENT)
Dept: FAMILY MEDICINE | Facility: CLINIC | Age: 42
End: 2022-10-18

## 2022-10-18 DIAGNOSIS — R22.41 LUMP OF THIGH, RIGHT: Primary | ICD-10-CM

## 2022-10-18 NOTE — TELEPHONE ENCOUNTER
Order/Referral Request    Who is requesting: PT    Orders being requested: reinstate order for MRI    Reason service is needed/diagnosis: was unable to get MRI    When are orders needed by: 10/21/22    Has this been discussed with Provider: Yes    Does patient have a preference on a Group/Provider/Facility? [facility] in [place]    Does patient have an appointment scheduled?: No    Where to send orders: please send to [facility] Fax [phone]    Could we send this information to you in [place] or would you prefer to receive a phone call?:   Patient would prefer a phone call  [phone]  Okay to leave a detailed message?: Yes at Cell number on file:    Telephone Information:   Mobile [phone]

## 2022-10-19 ENCOUNTER — TELEPHONE (OUTPATIENT)
Dept: NURSING | Facility: CLINIC | Age: 42
End: 2022-10-19

## 2022-10-19 ENCOUNTER — TELEPHONE (OUTPATIENT)
Dept: FAMILY MEDICINE | Facility: CLINIC | Age: 42
End: 2022-10-19

## 2022-10-19 NOTE — TELEPHONE ENCOUNTER
Orquidea from [facility] calling to clarify orders for MRI that were placed yesterday. States that MRI is ordered for left thigh and femur, but diagnosis code states lump of right thigh.     Per chart review, appears there is a lump on the right thigh that patient has previously been seen for. Called patient to clarify and he confirmed that it is the right thigh that is needing the imaging.     Orquidea is also wanting to clarify whether femur and thigh need to be imaged or just the thigh? Please advise.    Cece Morrison RN

## 2022-10-19 NOTE — TELEPHONE ENCOUNTER
[facility] calling for clarification of MRI orders for patient. Transferred caller to nurse at [facility].    Lucia Moncada RN  10/19/22 8:32 AM  [facility] Nurse Advisor

## 2022-10-20 NOTE — TELEPHONE ENCOUNTER
Please inform radiology that thigh soft tissue has a lump like appearance.  MRI of thigh.  However, I am unable to order this as MRI of thigh only and hence ordered it as femur and thigh    Jenifer Soliz MD

## 2022-10-27 ENCOUNTER — TRANSFERRED RECORDS (OUTPATIENT)
Dept: HEALTH INFORMATION MANAGEMENT | Facility: CLINIC | Age: 42
End: 2022-10-27

## 2022-11-09 ENCOUNTER — TELEPHONE (OUTPATIENT)
Dept: FAMILY MEDICINE | Facility: CLINIC | Age: 42
End: 2022-11-09

## 2022-11-09 NOTE — TELEPHONE ENCOUNTER
Wondering if you've had a chance to review the results of the MRI and what the next steps would be.    Reason for Call:  Other call back    Detailed comments: Wondering if you've had a chance to review the results of the MRI and what the next steps would be    Phone Number Patient can be reached at: Cell number on file:    Telephone Information:   Mobile [phone]       Best Time: any    Can we leave a detailed message on this number? YES    Call taken on 11/9/2022 at 8:29 AM by Allison Julian

## 2022-11-09 NOTE — TELEPHONE ENCOUNTER
Called [facility] to get results faxed to clinic. Once we receive it I will place a copy in  in-basket to review.

## 2022-11-10 ENCOUNTER — TELEPHONE (OUTPATIENT)
Dept: FAMILY MEDICINE | Facility: CLINIC | Age: 42
End: 2022-11-10

## 2022-11-10 DIAGNOSIS — R22.41 LUMP OF THIGH, RIGHT: Primary | ICD-10-CM

## 2022-11-10 NOTE — TELEPHONE ENCOUNTER
I have made a referral to surgery.  The initial visit would be to discuss this.  Please help schedule appointment and inform patient.    Jenifer Soliz MD

## 2022-11-10 NOTE — TELEPHONE ENCOUNTER
Patient is calling back regarding his test results showing a lipoma. Patient's wife wants to know if this would be in out patient procedure or what this usually looks like for surgery.   Please let his wife Hortensia know so she can call for his referral once placed.

## 2022-11-10 NOTE — TELEPHONE ENCOUNTER
Please see other message.  Patient has a lipoma.  If he would like to see surgery, let me know.    Jenifer Soliz MD

## 2022-11-17 ENCOUNTER — OFFICE VISIT (OUTPATIENT)
Dept: SURGERY | Facility: CLINIC | Age: 42
End: 2022-11-17
Attending: FAMILY MEDICINE
Payer: COMMERCIAL

## 2022-11-17 VITALS — WEIGHT: 225 LBS | SYSTOLIC BLOOD PRESSURE: 124 MMHG | DIASTOLIC BLOOD PRESSURE: 62 MMHG | BODY MASS INDEX: 26.68 KG/M2

## 2022-11-17 DIAGNOSIS — D17.9 INTRAMUSCULAR LIPOMA: Primary | ICD-10-CM

## 2022-11-17 PROCEDURE — 99203 OFFICE O/P NEW LOW 30 MIN: CPT | Performed by: SURGERY

## 2022-11-17 NOTE — PROGRESS NOTES
History:   Suhas Barbosa is a 42 year old male referred to general surgery by Dr. Soliz for a thigh mass.  He presents with his wife, Laura Martínez was last seen by our group in 2018 for an inguinal hernia repair.  Over the last year he noticed a lump within the upper aspect of his right thigh.  At first he thought it looked back and weird.  Recently it has started to become sore and feel warm.  When he stands for long periods of time or sits in the car for road trip it will become even more uncomfortable.  He initially had work-up with ultrasound.  It failed to reveal any masses.  He then underwent MRI and it demonstrated an intramuscular lipoma.      Allergies:  Penicillins    Past medical history:  Denies    Past surgical history:  Lap LIH  Nasal fracture surgery    Medications:     cetirizine-pseudoephedrine (ZYRTEC-D) 5-120 mg per tablet, [CETIRIZINE-PSEUDOEPHEDRINE (ZYRTEC-D) 5-120 MG PER TABLET] Take 1 tablet by mouth 2 (two) times a day., Disp: , Rfl:      cholecalciferol (VITAMIN D3) 125 mcg (5000 units) capsule, , Disp: , Rfl:      ibuprofen (ADVIL,MOTRIN) 600 MG tablet, [IBUPROFEN (ADVIL,MOTRIN) 600 MG TABLET] Take 600 mg by mouth., Disp: , Rfl:      Magnesium 400 MG CAPS, , Disp: , Rfl:     Family history:  No known family history of anesthesia problems    Social history:  Occasionally consumes alcohol.  Denies tobacco illicit drug use.    Exam:  /62 (BP Location: Right arm)   Wt 102.1 kg (225 lb)   BMI 26.68 kg/m    Body mass index is 26.68 kg/m .  General : Alert, cooperative, appears stated age   Skin: 6 x 8 cm fullness in the upper anterior thigh.  Consistency changes when he goes from standing to sitting.  These findings are consistent with an intramuscular as opposed to a subcutaneous mass.  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  He would like to have this removed.  I discussed that I would feel most comfortable removing this lesion in the operating room under MAC anesthesia.  We discussed the small risk of bleeding and infection, as well as the postoperative recovery.  He works in [employer] and I anticipate he would be able to return to work within a couple of days.  They agree and would like to proceed with scheduling surgery, which we will schedule at their earliest convenience.     25 minutes was spent in chart prep, discussion, and documentation.    Luz Sood DO  General Surgeon  [facility]  Surgery [address]  [place] [address]  Office: [phone]  Employed by - [facility] [employer] [employer]

## 2022-11-17 NOTE — LETTER
11/17/2022         RE: Suhas Barbosa  [address] [facility] [address]        Dear Colleague,    Thank you for referring your patient, Suhas Barbosa, to the [facility] SURGERY [facility] AND BARIATRICS CARE [place]. Please see a copy of my visit note below.    History:   Suhas Barbosa is a 42 year old male referred to general surgery by Dr. Soliz for a thigh mass.  He presents with his wife, Hortensia.  Mauricio was last seen by our group in 2018 for an inguinal hernia repair.  Over the last year he noticed a lump within the upper aspect of his right thigh.  At first he thought it looked back and weird.  Recently it has started to become sore and feel warm.  When he stands for long periods of time or sits in the car for road trip it will become even more uncomfortable.  He initially had work-up with ultrasound.  It failed to reveal any masses.  He then underwent MRI and it demonstrated an intramuscular lipoma.      Allergies:  Penicillins    Past medical history:  Denies    Past surgical history:  Lap LIH  Nasal fracture surgery    Medications:     cetirizine-pseudoephedrine (ZYRTEC-D) 5-120 mg per tablet, [CETIRIZINE-PSEUDOEPHEDRINE (ZYRTEC-D) 5-120 MG PER TABLET] Take 1 tablet by mouth 2 (two) times a day., Disp: , Rfl:      cholecalciferol (VITAMIN D3) 125 mcg (5000 units) capsule, , Disp: , Rfl:      ibuprofen (ADVIL,MOTRIN) 600 MG tablet, [IBUPROFEN (ADVIL,MOTRIN) 600 MG TABLET] Take 600 mg by mouth., Disp: , Rfl:      Magnesium 400 MG CAPS, , Disp: , Rfl:     Family history:  No known family history of anesthesia problems    Social history:  Occasionally consumes alcohol.  Denies tobacco illicit drug use.    Exam:  /62 (BP Location: Right arm)   Wt 102.1 kg (225 lb)   BMI 26.68 kg/m    Body mass index is 26.68 kg/m .  General : Alert, cooperative, appears stated age   Skin: 6 x 8 cm fullness in the upper anterior thigh.  Consistency changes when he goes from standing to  sitting.  These findings are consistent with an intramuscular as opposed to a subcutaneous mass.  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  He would like to have this removed.  I discussed that I would feel most comfortable removing this lesion in the operating room under MAC anesthesia.  We discussed the small risk of bleeding and infection, as well as the postoperative recovery.  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  They agree and would like to proceed with scheduling surgery, which we will schedule at their earliest convenience.     25 minutes was spent in chart prep, discussion, and documentation.    Luz Sood DO  General Surgeon  [facility]  Surgery [address]  [place] [address]  Office: [phone]  Employed by - [facility]          Again, thank you for allowing me to participate in the care of your patient.        Sincerely,        Luz Sood DO

## 2022-11-17 NOTE — H&P (VIEW-ONLY)
History:   Suhas Barbosa is a 42 year old male referred to general surgery by Dr. Soliz for a thigh mass.  He presents with his wife, Laura Martínez was last seen by our group in 2018 for an inguinal hernia repair.  Over the last year he noticed a lump within the upper aspect of his right thigh.  At first he thought it looked back and weird.  Recently it has started to become sore and feel warm.  When he stands for long periods of time or sits in the car for road trip it will become even more uncomfortable.  He initially had work-up with ultrasound.  It failed to reveal any masses.  He then underwent MRI and it demonstrated an intramuscular lipoma.      Allergies:  Penicillins    Past medical history:  Denies    Past surgical history:  Lap LIH  Nasal fracture surgery    Medications:     cetirizine-pseudoephedrine (ZYRTEC-D) 5-120 mg per tablet, [CETIRIZINE-PSEUDOEPHEDRINE (ZYRTEC-D) 5-120 MG PER TABLET] Take 1 tablet by mouth 2 (two) times a day., Disp: , Rfl:      cholecalciferol (VITAMIN D3) 125 mcg (5000 units) capsule, , Disp: , Rfl:      ibuprofen (ADVIL,MOTRIN) 600 MG tablet, [IBUPROFEN (ADVIL,MOTRIN) 600 MG TABLET] Take 600 mg by mouth., Disp: , Rfl:      Magnesium 400 MG CAPS, , Disp: , Rfl:     Family history:  No known family history of anesthesia problems    Social history:  Occasionally consumes alcohol.  Denies tobacco illicit drug use.    Exam:  /62 (BP Location: Right arm)   Wt 102.1 kg (225 lb)   BMI 26.68 kg/m    Body mass index is 26.68 kg/m .  General : Alert, cooperative, appears stated age   Skin: 6 x 8 cm fullness in the upper anterior thigh.  Consistency changes when he goes from standing to sitting.  These findings are consistent with an intramuscular as opposed to a subcutaneous mass.  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  He would like to have this removed.  I discussed that I would feel most comfortable removing this lesion in the operating room under MAC anesthesia.  We discussed the small risk of bleeding and infection, as well as the postoperative recovery.  He works in [employer] and I anticipate he would be able to return to work within a couple of days.  They agree and would like to proceed with scheduling surgery, which we will schedule at their earliest convenience.     25 minutes was spent in chart prep, discussion, and documentation.    Luz Sood DO  General Surgeon  [facility]  Surgery [address]  [place] [address]  Office: [phone]  Employed by - [facility]

## 2022-12-15 ENCOUNTER — ANESTHESIA EVENT (OUTPATIENT)
Dept: SURGERY | Facility: AMBULATORY SURGERY CENTER | Age: 42
End: 2022-12-15
Payer: COMMERCIAL

## 2022-12-16 ENCOUNTER — ANESTHESIA (OUTPATIENT)
Dept: SURGERY | Facility: AMBULATORY SURGERY CENTER | Age: 42
End: 2022-12-16
Payer: COMMERCIAL

## 2022-12-16 ENCOUNTER — HOSPITAL ENCOUNTER (OUTPATIENT)
Facility: AMBULATORY SURGERY CENTER | Age: 42
Discharge: HOME | End: 2022-12-16
Attending: SURGERY
Payer: COMMERCIAL

## 2022-12-16 VITALS
WEIGHT: 225 LBS | DIASTOLIC BLOOD PRESSURE: 72 MMHG | HEART RATE: 63 BPM | BODY MASS INDEX: 26.57 KG/M2 | HEIGHT: 77 IN | SYSTOLIC BLOOD PRESSURE: 134 MMHG | TEMPERATURE: 97.4 F | OXYGEN SATURATION: 97 % | RESPIRATION RATE: 16 BRPM

## 2022-12-16 DIAGNOSIS — D17.9 INTRAMUSCULAR LIPOMA: ICD-10-CM

## 2022-12-16 DIAGNOSIS — G89.18 POSTOPERATIVE PAIN: Primary | ICD-10-CM

## 2022-12-16 PROCEDURE — 27324 BIOPSY THIGH SOFT TISSUES: CPT | Mod: 52 | Performed by: SURGERY

## 2022-12-16 RX ORDER — FENTANYL CITRATE 0.05 MG/ML
25 INJECTION, SOLUTION INTRAMUSCULAR; INTRAVENOUS EVERY 5 MIN PRN
Status: DISCONTINUED | OUTPATIENT
Start: 2022-12-16 | End: 2022-12-17 | Stop reason: HOSPADM

## 2022-12-16 RX ORDER — ONDANSETRON 2 MG/ML
INJECTION INTRAMUSCULAR; INTRAVENOUS PRN
Status: DISCONTINUED | OUTPATIENT
Start: 2022-12-16 | End: 2022-12-16

## 2022-12-16 RX ORDER — PROPOFOL 10 MG/ML
INJECTION, EMULSION INTRAVENOUS PRN
Status: DISCONTINUED | OUTPATIENT
Start: 2022-12-16 | End: 2022-12-16

## 2022-12-16 RX ORDER — MEPERIDINE HYDROCHLORIDE 25 MG/ML
12.5 INJECTION INTRAMUSCULAR; INTRAVENOUS; SUBCUTANEOUS
Status: DISCONTINUED | OUTPATIENT
Start: 2022-12-16 | End: 2022-12-17 | Stop reason: HOSPADM

## 2022-12-16 RX ORDER — SODIUM CHLORIDE, SODIUM LACTATE, POTASSIUM CHLORIDE, CALCIUM CHLORIDE 600; 310; 30; 20 MG/100ML; MG/100ML; MG/100ML; MG/100ML
INJECTION, SOLUTION INTRAVENOUS CONTINUOUS
Status: DISCONTINUED | OUTPATIENT
Start: 2022-12-16 | End: 2022-12-17 | Stop reason: HOSPADM

## 2022-12-16 RX ORDER — FENTANYL CITRATE 50 UG/ML
INJECTION, SOLUTION INTRAMUSCULAR; INTRAVENOUS PRN
Status: DISCONTINUED | OUTPATIENT
Start: 2022-12-16 | End: 2022-12-16

## 2022-12-16 RX ORDER — FENTANYL CITRATE 0.05 MG/ML
25 INJECTION, SOLUTION INTRAMUSCULAR; INTRAVENOUS
Status: DISCONTINUED | OUTPATIENT
Start: 2022-12-16 | End: 2022-12-17 | Stop reason: HOSPADM

## 2022-12-16 RX ORDER — PROPOFOL 10 MG/ML
INJECTION, EMULSION INTRAVENOUS CONTINUOUS PRN
Status: DISCONTINUED | OUTPATIENT
Start: 2022-12-16 | End: 2022-12-16

## 2022-12-16 RX ORDER — KETOROLAC TROMETHAMINE 15 MG/ML
INJECTION, SOLUTION INTRAMUSCULAR; INTRAVENOUS PRN
Status: DISCONTINUED | OUTPATIENT
Start: 2022-12-16 | End: 2022-12-16

## 2022-12-16 RX ORDER — KETAMINE HYDROCHLORIDE 10 MG/ML
INJECTION INTRAMUSCULAR; INTRAVENOUS PRN
Status: DISCONTINUED | OUTPATIENT
Start: 2022-12-16 | End: 2022-12-16

## 2022-12-16 RX ORDER — ONDANSETRON 2 MG/ML
4 INJECTION INTRAMUSCULAR; INTRAVENOUS EVERY 30 MIN PRN
Status: DISCONTINUED | OUTPATIENT
Start: 2022-12-16 | End: 2022-12-17 | Stop reason: HOSPADM

## 2022-12-16 RX ORDER — TRAMADOL HYDROCHLORIDE 50 MG/1
50 TABLET ORAL EVERY 6 HOURS PRN
Qty: 8 TABLET | Refills: 0 | Status: SHIPPED | OUTPATIENT
Start: 2022-12-16 | End: 2022-12-19

## 2022-12-16 RX ORDER — ONDANSETRON 4 MG/1
4 TABLET, ORALLY DISINTEGRATING ORAL EVERY 30 MIN PRN
Status: DISCONTINUED | OUTPATIENT
Start: 2022-12-16 | End: 2022-12-17 | Stop reason: HOSPADM

## 2022-12-16 RX ORDER — FENTANYL CITRATE 0.05 MG/ML
50 INJECTION, SOLUTION INTRAMUSCULAR; INTRAVENOUS EVERY 5 MIN PRN
Status: DISCONTINUED | OUTPATIENT
Start: 2022-12-16 | End: 2022-12-17 | Stop reason: HOSPADM

## 2022-12-16 RX ORDER — LIDOCAINE 40 MG/G
CREAM TOPICAL
Status: DISCONTINUED | OUTPATIENT
Start: 2022-12-16 | End: 2022-12-17 | Stop reason: HOSPADM

## 2022-12-16 RX ORDER — HYDROMORPHONE HCL IN WATER/PF 6 MG/30 ML
0.2 PATIENT CONTROLLED ANALGESIA SYRINGE INTRAVENOUS EVERY 5 MIN PRN
Status: DISCONTINUED | OUTPATIENT
Start: 2022-12-16 | End: 2022-12-17 | Stop reason: HOSPADM

## 2022-12-16 RX ORDER — HYDROMORPHONE HCL IN WATER/PF 6 MG/30 ML
0.4 PATIENT CONTROLLED ANALGESIA SYRINGE INTRAVENOUS EVERY 5 MIN PRN
Status: DISCONTINUED | OUTPATIENT
Start: 2022-12-16 | End: 2022-12-17 | Stop reason: HOSPADM

## 2022-12-16 RX ADMIN — PROPOFOL 50 MG: 10 INJECTION, EMULSION INTRAVENOUS at 10:55

## 2022-12-16 RX ADMIN — KETAMINE HYDROCHLORIDE 30 MG: 10 INJECTION INTRAMUSCULAR; INTRAVENOUS at 09:43

## 2022-12-16 RX ADMIN — KETOROLAC TROMETHAMINE 15 MG: 15 INJECTION, SOLUTION INTRAMUSCULAR; INTRAVENOUS at 10:58

## 2022-12-16 RX ADMIN — SODIUM CHLORIDE, SODIUM LACTATE, POTASSIUM CHLORIDE, CALCIUM CHLORIDE: 600; 310; 30; 20 INJECTION, SOLUTION INTRAVENOUS at 08:11

## 2022-12-16 RX ADMIN — ONDANSETRON 4 MG: 2 INJECTION INTRAMUSCULAR; INTRAVENOUS at 09:59

## 2022-12-16 RX ADMIN — PROPOFOL 200 MCG/KG/MIN: 10 INJECTION, EMULSION INTRAVENOUS at 09:38

## 2022-12-16 RX ADMIN — SODIUM CHLORIDE, SODIUM LACTATE, POTASSIUM CHLORIDE, CALCIUM CHLORIDE: 600; 310; 30; 20 INJECTION, SOLUTION INTRAVENOUS at 11:01

## 2022-12-16 RX ADMIN — FENTANYL CITRATE 50 MCG: 50 INJECTION, SOLUTION INTRAMUSCULAR; INTRAVENOUS at 09:49

## 2022-12-16 RX ADMIN — FENTANYL CITRATE 50 MCG: 50 INJECTION, SOLUTION INTRAMUSCULAR; INTRAVENOUS at 09:37

## 2022-12-16 NOTE — ANESTHESIA PREPROCEDURE EVALUATION
Anesthesia Pre-Procedure Evaluation    Patient: Suhas Barbosa   MRN: [id number] : 1980        Procedure : Procedure(s):  EXCISION OF RIGHT THIGH INTRAMUSCULAR LIPOMA          Past Medical History:   Diagnosis Date     Acute pharyngitis     Created by Conversion      Hiatal hernia      Septic prepatellar bursitis of right knee 2018      Past Surgical History:   Procedure Laterality Date     NASAL FRACTURE SURGERY       NE LAP,INGUINAL HERNIA REPR,INITIAL Left 2018    Procedure: REPAIR, HERNIA, INGUINAL, LAPAROSCOPIC;  Surgeon: Venkatesh Fernandez MD;  Location: formerly [facility];  Service: General      Allergies   Allergen Reactions     Penicillins Hives      Social History     Tobacco Use     Smoking status: Former     Packs/day: 0.50     Years: 5.00     Pack years: 2.50     Types: Cigarettes     Start date: 2001     Quit date: 2006     Years since quittin.9     Smokeless tobacco: Never   Substance Use Topics     Alcohol use: Yes     Comment: Occ      Wt Readings from Last 1 Encounters:   22 102.1 kg (225 lb)        Anesthesia Evaluation   Pt has had prior anesthetic.     No history of anesthetic complications       ROS/MED HX  ENT/Pulmonary:  - neg pulmonary ROS     Neurologic:  - neg neurologic ROS     Cardiovascular:  - neg cardiovascular ROS     METS/Exercise Tolerance: >4 METS    Hematologic:  - neg hematologic  ROS     Musculoskeletal:  - neg musculoskeletal ROS     GI/Hepatic:  - neg GI/hepatic ROS  (-) hiatal hernia   Renal/Genitourinary:  - neg Renal ROS     Endo:  - neg endo ROS     Psychiatric/Substance Use:  - neg psychiatric ROS     Infectious Disease:  - neg infectious disease ROS     Malignancy:  - neg malignancy ROS     Other:  - neg other ROS          Physical Exam    Airway  airway exam normal      Mallampati: II   TM distance: > 3 FB   Neck ROM: full   Mouth opening: > 3 cm    Respiratory Devices and Support         Dental  no notable dental history          Cardiovascular   cardiovascular exam normal          Pulmonary   pulmonary exam normal                OUTSIDE LABS:  CBC:   Lab Results   Component Value Date    WBC 5.5 04/28/2022    HGB 14.7 04/28/2022    HCT 44.1 04/28/2022     04/28/2022     BMP:   Lab Results   Component Value Date     04/28/2022    POTASSIUM 4.5 04/28/2022    CHLORIDE 101 04/28/2022    CO2 26 04/28/2022    BUN 13 04/28/2022    CR 1.00 04/28/2022    GLC 86 04/28/2022     COAGS: No results found for: PTT, INR, FIBR  POC: No results found for: BGM, HCG, HCGS  HEPATIC:   Lab Results   Component Value Date    ALBUMIN 4.2 04/28/2022    PROTTOTAL 7.3 04/28/2022    ALT 23 04/28/2022    AST 25 04/28/2022    ALKPHOS 44 (L) 04/28/2022    BILITOTAL 0.7 04/28/2022     OTHER:   Lab Results   Component Value Date    ENRICO 9.9 04/28/2022       Anesthesia Plan    ASA Status:  1   NPO Status:  NPO Appropriate    Anesthesia Type: MAC.     - Reason for MAC: straight local not clinically adequate   Induction: Propofol.   Maintenance: TIVA.        Consents    Anesthesia Plan(s) and associated risks, benefits, and realistic alternatives discussed. Questions answered and patient/representative(s) expressed understanding.    - Discussed:     - Discussed with:  Patient      - Extended Intubation/Ventilatory Support Discussed: No.      - Patient is DNR/DNI Status: No    Use of blood products discussed: No .     Postoperative Care    Pain management: IV analgesics, Multi-modal analgesia, Oral pain medications.   PONV prophylaxis: Ondansetron (or other 5HT-3)     Comments:                Luc Chung MD

## 2022-12-16 NOTE — INTERVAL H&P NOTE
Patient seen in preop.  Denies new medical history since he was last seen.  All questions answered regarding procedure.  Consent obtained.  To the OR for excision of right thigh intramuscular lipoma.    Luz Sood DO  General Surgeon  [facility]  Surgery Clinic - [address] [address]?  Office: [phone]  Employed by - [facility]

## 2022-12-16 NOTE — ANESTHESIA POSTPROCEDURE EVALUATION
Patient: Suhas Barbosa    Procedure: Procedure(s):  RIGHT THIGH EXPLORATION       Anesthesia Type:  MAC    Note:  Disposition: Outpatient   Postop Pain Control: Uneventful            Sign Out: Well controlled pain   PONV: No   Neuro/Psych: Uneventful            Sign Out: Acceptable/Baseline neuro status   Airway/Respiratory: Uneventful            Sign Out: Acceptable/Baseline resp. status   CV/Hemodynamics: Uneventful            Sign Out: Acceptable CV status; No obvious hypovolemia; No obvious fluid overload   Other NRE: NONE   DID A NON-ROUTINE EVENT OCCUR?            Last vitals:  Vitals Value Taken Time   /76 12/16/22 1146   Temp 97.4  F (36.3  C) 12/16/22 1130   Pulse 52 12/16/22 1145   Resp 16 12/16/22 1130   SpO2 99 % 12/16/22 1145   Vitals shown include unvalidated device data.    Electronically Signed By: Luc Chung MD  December 16, 2022  12:36 PM

## 2022-12-16 NOTE — ANESTHESIA CARE TRANSFER NOTE
Patient: Suhas Barbosa    Procedure: Procedure(s):  RIGHT THIGH EXPLORATION       Diagnosis: Intramuscular lipoma [D17.9]  Diagnosis Additional Information: No value filed.    Anesthesia Type:   MAC     Note:    Oropharynx: spontaneously breathing and oropharynx clear of all foreign objects  Level of Consciousness: drowsy  Oxygen Supplementation: room air    Independent Airway: airway patency satisfactory and stable  Dentition: dentition unchanged  Vital Signs Stable: post-procedure vital signs reviewed and stable  Report to RN Given: handoff report given  Patient transferred to: Phase II    Handoff Report: Identifed the Patient, Identified the Reponsible Provider, Reviewed the pertinent medical history, Discussed the surgical course, Reviewed Intra-OP anesthesia mangement and issues during anesthesia, Set expectations for post-procedure period and Allowed opportunity for questions and acknowledgement of understanding      Vitals:  Vitals Value Taken Time   /65 12/16/22 1117   Temp 36.4  C (97.5  F) 12/16/22 1117   Pulse 72 12/16/22 1117   Resp 16 12/16/22 1117   SpO2 96 % 12/16/22 1117       Electronically Signed By: OLIVIA Avila CRNA  December 16, 2022  11:19 AM

## 2022-12-16 NOTE — OP NOTE
Name:  Suhas Barbosa  PCP:  MartínezJenifer chahal  Procedure Date:  12/16/2022      RIGHT THIGH EXPLORATION      Pre-Procedure Diagnosis:  Intramuscular lipoma    Post-Procedure Diagnosis:    Asymmetrical muscle    Surgeon:  Luz Sood DO    Anesthesia Type:    MAC    Estimated Blood Loss:   1 cc    Specimens:    None    Complications:    None.  Although procedure not as expected.    Indication for procedure:  This is a 42-year-old male who noticed a fullness to his right anterior upper thigh.  His leg has become sore and sometimes feels warm.  With position changes, the mass seems to change.  He underwent MRI and was found to have an intramuscular lipoma.    Operative Report:    After informed consent was obtained, and the risks and benefits of the procedure were discussed, the patient was brought back to the operative suite and placed in the supine position.  MAC anesthesia was provided by the anesthesia department.  The right upper anterior thigh was prepped and draped in the usual sterile fashion.  Local anesthetic was administered over the apex of the mass.  A longitudinal incision was made in the central aspect of the anterior right thigh.  This was right at the apex of where the thigh had been marked for the mass.  Electrocautery was utilized to dissect through the subcutaneous tissues down to the fascia of the rectus femoris.  The fascia was incised and the muscle fibers were split along their length.  A significant amount of time was utilized to evaluate the muscles in the anterior thigh.  No lipoma could be seen or palpated.  Ultrasound was brought into the room and draped.  No intramuscular mass was seen.  Dr. Zepeda then joined the procedure to help look for this so-called lipoma.  There was scant adipose in between muscle fibers but no actual lipoma was seen.  The patient was then woken up with his leg open and he was asked to flex his thigh muscles.  As he flexed, the muscles themselves tensed and  "thickened in the area of his \"mass,\" but no actual lesion was found.  It turned out that the fullness in his thigh was the muscle itself.  At this point there was no further exploration.  The patient was put back to sleep and the fascia was closed with running 2-0 Vicryl.  The subcutaneous tissue was then closed with interrupted 3-0 Vicryl followed by running subcuticular 4-0 Monocryl for the skin and Dermabond.    Disposition:  The patient tolerated the procedure well, and was transferred to the postprocedural care unit in stable condition.      Luz Sood DO  General Surgeon  [facility]  Surgery [address] [address]  Office: [phone]  Employed by - [facility] [employer] [facility]           "

## 2022-12-16 NOTE — DISCHARGE INSTRUCTIONS
You received a medication called Toradol (a stronger IV ibuprofen) at 10:58 AM. Do NOT take any Ibuprofen / Advil / Aleve / Naproxen or products containing Ibuprofen until 4:58 PM or later.    If you have any questions or concerns regarding your procedure, please contact Dr. Luz Sood, her office number is [phone].

## 2022-12-28 ENCOUNTER — OFFICE VISIT (OUTPATIENT)
Dept: SURGERY | Facility: CLINIC | Age: 42
End: 2022-12-28
Payer: COMMERCIAL

## 2022-12-28 DIAGNOSIS — D17.9 INTRAMUSCULAR LIPOMA: Primary | ICD-10-CM

## 2022-12-28 PROCEDURE — 99024 POSTOP FOLLOW-UP VISIT: CPT | Performed by: PHYSICIAN ASSISTANT

## 2022-12-28 NOTE — LETTER
12/28/2022         RE: Suhas Barbosa  [address] [facility] [address]        Dear Colleague,    Thank you for referring your patient, Suhas Barbosa, to the [facility] SURGERY CLINIC AND BARIATRICS CARE [place]. Please see a copy of my visit note below.    HPI: Pt is here for follow up s/p right thigh exploration with Dr. Sood on 12/16. Overall doing well. Still has some swelling and the lump in his thigh seems bigger than prior to surgery. Denies fevers or chills. No drainage.     EXAM:  GENERAL:Appears well  SURGICAL WOUNDS:  Incisions healing well, no induration or drainage, no erythema or underlying fluctuance. There is ecchymosis that appears to be fading. 5x5 cm area that is raised, soft, nontender.       Assessment/Plan: Doing well after surgery and should follow up as needed.    Reassured him that the increase size in the lump is either scar tissue or possible hematoma related. He does not have any overt signs of infection. He can try heat and gentle massage to the area.     Nahid Hanna PA-C  [facility]  Surgery [address]  [place] [address]?  Office: [phone]            Again, thank you for allowing me to participate in the care of your patient.        Sincerely,        Nahid Hanna PA-C

## 2022-12-28 NOTE — PROGRESS NOTES
HPI: Pt is here for follow up s/p right thigh exploration with Dr. Sood on 12/16. Overall doing well. Still has some swelling and the lump in his thigh seems bigger than prior to surgery. Denies fevers or chills. No drainage.     EXAM:  GENERAL:Appears well  SURGICAL WOUNDS:  Incisions healing well, no induration or drainage, no erythema or underlying fluctuance. There is ecchymosis that appears to be fading. 5x5 cm area that is raised, soft, nontender.       Assessment/Plan: Doing well after surgery and should follow up as needed.    Reassured him that the increase size in the lump is either scar tissue or possible hematoma related. He does not have any overt signs of infection. He can try heat and gentle massage to the area.     Nahid Hanna PA-C  [facility]  Surgery Clinic [address]  [place] [address]?  Office: [phone]

## 2023-06-04 ENCOUNTER — HEALTH MAINTENANCE LETTER (OUTPATIENT)
Age: 43
End: 2023-06-04

## 2023-07-11 ASSESSMENT — ENCOUNTER SYMPTOMS
DIZZINESS: 0
NAUSEA: 0
EYE PAIN: 0
HEARTBURN: 0
MYALGIAS: 0
ARTHRALGIAS: 0
FEVER: 0
SORE THROAT: 0
PARESTHESIAS: 0
SHORTNESS OF BREATH: 0
NERVOUS/ANXIOUS: 0
HEMATOCHEZIA: 0
DIARRHEA: 0
PALPITATIONS: 0
WEAKNESS: 0
DYSURIA: 0
HEMATURIA: 0
FREQUENCY: 0
ABDOMINAL PAIN: 0
CONSTIPATION: 0
JOINT SWELLING: 0
COUGH: 0
CHILLS: 0
HEADACHES: 0

## 2023-07-14 ENCOUNTER — OFFICE VISIT (OUTPATIENT)
Dept: FAMILY MEDICINE | Facility: CLINIC | Age: 43
End: 2023-07-14
Payer: COMMERCIAL

## 2023-07-14 VITALS
HEIGHT: 77 IN | WEIGHT: 243.5 LBS | BODY MASS INDEX: 28.75 KG/M2 | TEMPERATURE: 97.8 F | DIASTOLIC BLOOD PRESSURE: 81 MMHG | RESPIRATION RATE: 16 BRPM | OXYGEN SATURATION: 96 % | HEART RATE: 73 BPM | SYSTOLIC BLOOD PRESSURE: 128 MMHG

## 2023-07-14 DIAGNOSIS — Z00.00 ROUTINE GENERAL MEDICAL EXAMINATION AT A HEALTH CARE FACILITY: Primary | ICD-10-CM

## 2023-07-14 DIAGNOSIS — I83.91 VARICOSE VEINS OF RIGHT LOWER EXTREMITY, UNSPECIFIED WHETHER COMPLICATED: ICD-10-CM

## 2023-07-14 PROCEDURE — 99396 PREV VISIT EST AGE 40-64: CPT | Performed by: FAMILY MEDICINE

## 2023-07-14 ASSESSMENT — ENCOUNTER SYMPTOMS
PARESTHESIAS: 0
WEAKNESS: 0
NAUSEA: 0
JOINT SWELLING: 0
DYSURIA: 0
CONSTIPATION: 0
HEADACHES: 0
EYE PAIN: 0
COUGH: 0
DIZZINESS: 0
ABDOMINAL PAIN: 0
HEMATOCHEZIA: 0
SHORTNESS OF BREATH: 0
SORE THROAT: 0
HEMATURIA: 0
HEARTBURN: 0
PALPITATIONS: 0
NERVOUS/ANXIOUS: 0
FEVER: 0
ARTHRALGIAS: 0
DIARRHEA: 0
MYALGIAS: 0
FREQUENCY: 0
CHILLS: 0

## 2023-08-21 NOTE — PATIENT INSTRUCTIONS
Suhas    Thank you for entrusting your care with us at the [facility].      We are prescribing some compression stockings for you. I have included different suppliers that should help you get measured and fitting to ensure proper fitting socks. You should wear these stockings as much as you can. It is especially important to wear them with long periods of standing, sitting, long car rides or if you will be flying. Compression socks should get refilled every 4-6 months. They do not need to be worn at night while in bed. It is recommended to wear compression level of 20-30mmhg or higher from toes to knees.      We will perform an ultrasound prior to your appointment with us in 3 months time to evaluate the valves in your veins.      We would like you to follow up in 3 months after wearing socks to see how you are doing.        Varicose Veins    Varicose veins are twisted, enlarged veins near the surface of the skin. They develop most often in the legs and ankles.    Some people may be more likely than others to get varicose veins because of several things. These include aging, pregnancy, being overweight, or because a parent has them. Standing or sitting for long periods of time can also increase risk of varicose veins.    Follow-up care is a key part of your treatment and safety. Be sure to make and go to all appointments, and call your doctor if you are having problems. It's also a good idea to know your test results and keep a list of the medicines you take.      Varicose veins are caused by weakened valves and veins in your legs. Normally, one-way valves in your veins keep blood flowing from your legs up toward your heart. When these valves don't work as they should, blood collects in your legs, and pressure builds up. The veins become weak, large, and twisted.    How can you care for yourself at home?  Wear compression stockings during the day to help relieve symptoms and improve blood  flow. Talk to your doctor about which ones to get and where to get them.  Prop up your legs at or above the level of your heart when possible. Try to do this for about 30 minutes at a time, about 3 times a day. This helps keep the blood from pooling in your lower legs and improves blood flow to the rest of your body.  Avoid sitting and standing for long periods. This puts added stress on your veins.  Stay at a healthy weight. Lose weight if you need to.  Try to take several short walks every day.  Get at least 30 minutes of exercise on most days of the week. Walking is a good choice. You also may want to do other activities, such as running, swimming, cycling, or playing tennis or team sports.  Do calf muscle exercises every day. When you are sitting down, rotate your feet at the ankles in both directions, making small circles. Extend your legs, and point and flex your feet.  Avoid crossing your legs at the knees when sitting.  Take good care of your skin. Treat cuts and scrapes on your legs right away. Keep your legs clean and moisturized to prevent drying and cracking. Prevent sunburns.  Do not smoke. Smoking can make varicose veins worse. If you need help quitting, talk to your doctor about stop-smoking programs and medicines. These can increase your chances of quitting for good.  If you bump your leg so hard that you know it is likely to bruise, prop up your leg and apply ice or cold packs right away. Apply the ice or cold pack for 10 to 20 minutes, 3 or more times a day. Put a thin cloth between the ice and your skin.  If you cut or scratch the skin over a vein, it may bleed a lot. Prop up your leg and apply firm pressure for a full 15 minutes.  If you have a blood clot in a varicose vein, you may have tenderness and swelling over the vein. The vein may feel firm. Be sure to call your doctor right away if you have these symptoms. If your doctor has told you how to care for the clot, follow the instructions.      Care may include the following:    Prop up your leg and apply a damp cloth that is warm or cool.  Ask your doctor if you can take an over-the-counter pain medicine, such as acetaminophen (Tylenol), ibuprofen (Advil, Motrin), or naproxen (Aleve). Be safe with medicines. Read and follow all instructions on the label.    When should you call for help?     Call 911 anytime you think you may need emergency care. For example, call if:    You have sudden chest pain and shortness of breath, or you cough up blood.    Call your doctor now or seek immediate medical care if:    You have signs of a blood clot in your leg (called a deep vein thrombosis), such as:  Pain in your calf, back of the knee, thigh, or groin.  Swelling in the leg or groin.  A color change on the leg or groin. The skin may be reddish or purplish, depending on your usual skin color.  A varicose vein begins to bleed and you cannot stop it.  You have a tender lump in your leg.  You get an open sore.    Watch closely for changes in your health, and be sure to contact your doctor if:    Your varicose vein symptoms do not improve with home treatment.    Current as of: December 19, 2022  Author: [employer] Staff  Medical Review:Johan Cobb MD - Family Medicine & KRISHAN Veloz MD - Internal Medicine & Tim Cutler MD - Family Medicine & Ryan Dent MD - Family Medicine & Harry Feng MD - Diagnostic Radiology    Please bring your compression prescription to a home medical supply store. Here is a list of locations but not limited to.     [facility]  [address] [place] [address]  Phone: [phone]  Fax: [phone] [facility]  [address] [place] [address]  Phone: [phone]  Fax: [phone]   [facility]  [address]  [place] [address]  Phone: [phone]  Fax: [phone] [facility]  [address] [place] [address]  Phone: [phone]  Fax: [phone]   [facility]  [address] [place] [address]  Phone: [phone] [facility]   [address] ([facility] [place])  [place] [address]  Phone: [phone]   [place]  [address] [place] [address]      Phone: [phone]  Fax: [phone] [place]  [address] [place] [address]      Phone: [phone]  Fax: [phone]     [facility] [email or web address]  [address], [place] [address]  [phone]    [facility]  [email or web address]  [address] [place] [address]  Phone: [phone]     [address] [place] [address]  Phone: [phone]    [address] [place] [address]  Phone: [phone] [address], [place] [address]  Phone: [phone] [address], [place] [address]  Phone: [phone]    [facility] [email or web address]  [address], [place] [address]  Phone: [phone]    [address], [place] [address]  Phone: [phone]    [address], [place] [address]  Phone: [phone]    Carlos Gutierrez  [email or web address]  [phone]

## 2023-08-21 NOTE — PROGRESS NOTES
[facility]        Patient is here for a consult to discuss varicose vein(s). Dr. Soliz referring. The patient has varicose veins that are problematic in bilateral legs. Symptoms patient has been experiencing are heaviness, aching,  itching, tiredness, and  swelling. Patient states their varicose veins are bothersome when  walking upstairs, standing, sitting,  working, and household chores. Patient has been wearing compression stockings intermittently. Patient has not been using pain medication or anti-inflammatory's. Patient has not had recent imaging on legs done. Pt has hx of vein procedure twice on the right leg at another facility.    Pt is currently taking no meds that would impact our treatment plan.    97.1, 120/76, 67    The provider has been notified that the patient has concerns of Leg fatigue and discomfort.     Questions patient would like addressed today are: N/A.    Refills are needed: No    Has homecare services and agency name:  No

## 2023-08-22 ENCOUNTER — OFFICE VISIT (OUTPATIENT)
Dept: VASCULAR SURGERY | Facility: CLINIC | Age: 43
End: 2023-08-22
Attending: FAMILY MEDICINE
Payer: COMMERCIAL

## 2023-08-22 VITALS — BODY MASS INDEX: 29.49 KG/M2 | TEMPERATURE: 97.1 F | WEIGHT: 245.5 LBS | RESPIRATION RATE: 18 BRPM

## 2023-08-22 DIAGNOSIS — I83.91 VARICOSE VEINS OF RIGHT LOWER EXTREMITY, UNSPECIFIED WHETHER COMPLICATED: ICD-10-CM

## 2023-08-22 DIAGNOSIS — I83.893 SYMPTOMATIC VARICOSE VEINS OF BOTH LOWER EXTREMITIES: Primary | ICD-10-CM

## 2023-08-22 PROCEDURE — 99203 OFFICE O/P NEW LOW 30 MIN: CPT | Performed by: SPECIALIST

## 2023-08-22 PROCEDURE — G0463 HOSPITAL OUTPT CLINIC VISIT: HCPCS | Performed by: SPECIALIST

## 2023-08-22 ASSESSMENT — PAIN SCALES - GENERAL: PAINLEVEL: MODERATE PAIN (5)

## 2023-08-28 NOTE — PROGRESS NOTES
[facility] Vein Consult      Assessment:     1. varicose veins, bilateral   2. spider veins, bilateral     Plan:     1. Treatment options of conservative therapy of stockings use, exercise, weight loss, elevating legs when possible.    2. Script for compression stockings 20-30 mm hg  3. Ultrasound to evaluate legs for incompetency of both deep and superficial system .   4. Surgical treatment, discussed briefly today  5. Follow up: 3 months.   6. Call for any questions concerns or issues    Subjective:      Suhas Barbosa is a 43 year old male  who was referred by Jenifer Soliz  for evaluation of varicose veins. Symptoms include pain, aching, fatigue, burning, edema, and dermatitis. Patient has history of leg selling, pain and vein issues that have progressed. Pain and symptoms have affected daily living and work activities needing medications. Here for evaluation today. no stocking or compression devic use    Allergies:Penicillins    Past Medical History:   Diagnosis Date    Acute pharyngitis     Created by Conversion     Hiatal hernia     Septic prepatellar bursitis of right knee 11/21/2018       Past Surgical History:   Procedure Laterality Date    EXCISE LESION LOWER EXTREMITY Right 12/16/2022    Procedure: RIGHT THIGH EXPLORATION;  Surgeon: Luz Sood DO;  Location: [facility]    NASAL FRACTURE SURGERY      AK LAP,INGUINAL HERNIA REPR,INITIAL Left 9/21/2018    Procedure: REPAIR, HERNIA, INGUINAL, LAPAROSCOPIC;  Surgeon: Venkatesh Fernandez MD;  Location: [facility];  Service: General         Current Outpatient Medications:     cetirizine-pseudoephedrine (ZYRTEC-D) 5-120 mg per tablet, [CETIRIZINE-PSEUDOEPHEDRINE (ZYRTEC-D) 5-120 MG PER TABLET] Take 1 tablet by mouth 2 (two) times a day., Disp: , Rfl:     cholecalciferol (VITAMIN D3) 125 mcg (5000 units) capsule, , Disp: , Rfl:     Magnesium 400 MG CAPS, , Disp: , Rfl:     ibuprofen (ADVIL,MOTRIN) 600 MG tablet, [IBUPROFEN  (ADVIL,MOTRIN) 600 MG TABLET] Take 600 mg by mouth. (Patient not taking: Reported on 8/22/2023), Disp: , Rfl:      Family History   Problem Relation Age of Onset    Heart Disease Mother     Diabetes Father 66    No Known Problems Sister     No Known Problems Brother         reports that he quit smoking about 17 years ago. His smoking use included cigarettes. He started smoking about 22 years ago. He has a 2.50 pack-year smoking history. He has never used smokeless tobacco. He reports current alcohol use. He reports that he does not use drugs.      Review of Systems:    Pertinent items are noted in HPI.  Patient has symptomatic veins and changes of bilateral legs. These have progressed to the point of causing symptoms on a daily basis. This causes issues with daily activities and chores such as mowing lawn, outdoor upkeep, and standing for long lengths of time       Objective:     Vitals:    08/22/23 1002   Resp: 18   Temp: 97.1  F (36.2  C)   Weight: 111.4 kg (245 lb 8 oz)     Body mass index is 29.49 kg/m .    EXAM:  GENERAL: This is a well-developed 43 year old male who appears his stated age  HEAD: normocephalic  HEENT: Pupils equal and reactive bilaterally  MOUTH: mucus membranes intact. Normal dentation  CARDIAC: RRR without murmur  CHEST/LUNG:  Clear to auscultation bilaterally  ABDOMEN: Soft, nontender, nondistended, no masses noted   NEUROLOGIC: Focally intact, nonfocal, alert and oriented x 3  INTEGUMENT: No open lesions or ulcers  VASCULAR: Pulses intact, symmetrical upper and lower extremities. There areskin changes consistent with chronic venous insufficiency. Varicose veins present in bilateral greater saphenous distribution. Spider veins present bilateral.        VCSS  PAIN:: Moderate: Daily moderate activity limitation, occasional pain medication  Varicose Veins:: Moderate: Multiple: great saphenous confined to calf and thigh  Venous Edema:: Mild: Evening ankle swelling only  Skin Pigmentation::  Absent: None  Inflamation:: Absent: None  Induration:: Absent: None  Number of active ulcers:: 0  Active ulcer duration:: None  Active ulcer diameter:: None  Compression Therapy:: Intermittent use of stockings  VCSS Score:: 6  CEAP:: Ankle edema of venous origin (not foot edema)    Imaging:    pending    Venkatesh Fernandez MD  General Surgery [phone]  Vascular Surgery [phone]

## 2024-09-28 ENCOUNTER — HEALTH MAINTENANCE LETTER (OUTPATIENT)
Age: 44
End: 2024-09-28